# Patient Record
Sex: FEMALE | Race: OTHER | HISPANIC OR LATINO | URBAN - METROPOLITAN AREA
[De-identification: names, ages, dates, MRNs, and addresses within clinical notes are randomized per-mention and may not be internally consistent; named-entity substitution may affect disease eponyms.]

---

## 2020-01-31 ENCOUNTER — EMERGENCY (EMERGENCY)
Facility: HOSPITAL | Age: 80
LOS: 1 days | Discharge: HOME | End: 2020-01-31
Attending: EMERGENCY MEDICINE
Payer: COMMERCIAL

## 2020-01-31 VITALS
HEIGHT: 62 IN | WEIGHT: 160.06 LBS | DIASTOLIC BLOOD PRESSURE: 76 MMHG | SYSTOLIC BLOOD PRESSURE: 130 MMHG | HEART RATE: 94 BPM | TEMPERATURE: 99 F | OXYGEN SATURATION: 97 % | RESPIRATION RATE: 20 BRPM

## 2020-01-31 LAB
ALBUMIN SERPL ELPH-MCNC: 3.6 G/DL — SIGNIFICANT CHANGE UP (ref 3.5–5)
ALP SERPL-CCNC: 50 U/L — SIGNIFICANT CHANGE UP (ref 40–120)
ALT FLD-CCNC: 25 U/L DA — SIGNIFICANT CHANGE UP (ref 10–60)
ANION GAP SERPL CALC-SCNC: 7 MMOL/L — SIGNIFICANT CHANGE UP (ref 5–17)
AST SERPL-CCNC: 20 U/L — SIGNIFICANT CHANGE UP (ref 10–40)
BILIRUB SERPL-MCNC: 0.2 MG/DL — SIGNIFICANT CHANGE UP (ref 0.2–1.2)
BUN SERPL-MCNC: 13 MG/DL — SIGNIFICANT CHANGE UP (ref 7–18)
CALCIUM SERPL-MCNC: 8.5 MG/DL — SIGNIFICANT CHANGE UP (ref 8.4–10.5)
CHLORIDE SERPL-SCNC: 106 MMOL/L — SIGNIFICANT CHANGE UP (ref 96–108)
CO2 SERPL-SCNC: 26 MMOL/L — SIGNIFICANT CHANGE UP (ref 22–31)
CREAT SERPL-MCNC: 0.74 MG/DL — SIGNIFICANT CHANGE UP (ref 0.5–1.3)
GLUCOSE SERPL-MCNC: 126 MG/DL — HIGH (ref 70–99)
HCT VFR BLD CALC: 40.3 % — SIGNIFICANT CHANGE UP (ref 34.5–45)
HGB BLD-MCNC: 12.9 G/DL — SIGNIFICANT CHANGE UP (ref 11.5–15.5)
MCHC RBC-ENTMCNC: 29.6 PG — SIGNIFICANT CHANGE UP (ref 27–34)
MCHC RBC-ENTMCNC: 32 GM/DL — SIGNIFICANT CHANGE UP (ref 32–36)
MCV RBC AUTO: 92.4 FL — SIGNIFICANT CHANGE UP (ref 80–100)
NRBC # BLD: 0 /100 WBCS — SIGNIFICANT CHANGE UP (ref 0–0)
PLATELET # BLD AUTO: 213 K/UL — SIGNIFICANT CHANGE UP (ref 150–400)
POTASSIUM SERPL-MCNC: 3.8 MMOL/L — SIGNIFICANT CHANGE UP (ref 3.5–5.3)
POTASSIUM SERPL-SCNC: 3.8 MMOL/L — SIGNIFICANT CHANGE UP (ref 3.5–5.3)
PROT SERPL-MCNC: 7.9 G/DL — SIGNIFICANT CHANGE UP (ref 6–8.3)
RBC # BLD: 4.36 M/UL — SIGNIFICANT CHANGE UP (ref 3.8–5.2)
RBC # FLD: 13.7 % — SIGNIFICANT CHANGE UP (ref 10.3–14.5)
SODIUM SERPL-SCNC: 139 MMOL/L — SIGNIFICANT CHANGE UP (ref 135–145)
TROPONIN I SERPL-MCNC: <0.015 NG/ML — SIGNIFICANT CHANGE UP (ref 0–0.04)
WBC # BLD: 6.39 K/UL — SIGNIFICANT CHANGE UP (ref 3.8–10.5)
WBC # FLD AUTO: 6.39 K/UL — SIGNIFICANT CHANGE UP (ref 3.8–10.5)

## 2020-01-31 PROCEDURE — 99284 EMERGENCY DEPT VISIT MOD MDM: CPT | Mod: 25

## 2020-01-31 PROCEDURE — 99285 EMERGENCY DEPT VISIT HI MDM: CPT

## 2020-01-31 PROCEDURE — 80053 COMPREHEN METABOLIC PANEL: CPT

## 2020-01-31 PROCEDURE — 36415 COLL VENOUS BLD VENIPUNCTURE: CPT

## 2020-01-31 PROCEDURE — 93005 ELECTROCARDIOGRAM TRACING: CPT

## 2020-01-31 PROCEDURE — 94640 AIRWAY INHALATION TREATMENT: CPT

## 2020-01-31 PROCEDURE — 84484 ASSAY OF TROPONIN QUANT: CPT

## 2020-01-31 PROCEDURE — 85027 COMPLETE CBC AUTOMATED: CPT

## 2020-01-31 PROCEDURE — 71046 X-RAY EXAM CHEST 2 VIEWS: CPT | Mod: 26

## 2020-01-31 PROCEDURE — 71046 X-RAY EXAM CHEST 2 VIEWS: CPT

## 2020-01-31 RX ORDER — DEXAMETHASONE 0.5 MG/5ML
8 ELIXIR ORAL ONCE
Refills: 0 | Status: DISCONTINUED | OUTPATIENT
Start: 2020-01-31 | End: 2020-01-31

## 2020-01-31 RX ORDER — IPRATROPIUM/ALBUTEROL SULFATE 18-103MCG
3 AEROSOL WITH ADAPTER (GRAM) INHALATION ONCE
Refills: 0 | Status: COMPLETED | OUTPATIENT
Start: 2020-01-31 | End: 2020-01-31

## 2020-01-31 RX ADMIN — Medication 3 MILLILITER(S): at 06:31

## 2020-01-31 RX ADMIN — Medication 50 MILLIGRAM(S): at 05:31

## 2020-01-31 RX ADMIN — Medication 100 MILLIGRAM(S): at 05:31

## 2020-01-31 NOTE — ED PROVIDER NOTE - NS ED ROS FT
CONSTITUTIONAL: +fever, no chills   EYES: no visual changes, no eye pain   ENMT: no nasal congestion, no throat pain  CARDIOVASCULAR: no chest pain, no edema, no palpitations   RESPIRATORY: no shortness of breath, +cough   GASTROINTESTINAL: no abdominal pain, no nausea, no vomiting, no diarrhea, no constipation   GENITOURINARY: no dysuria, no frequency  MUSCULOSKELETAL: no joint pains, no myalgias, no back pain   SKIN: no rashes  NEUROLOGICAL: no weakness, no headache, no dizziness, no slurred speech, no syncope   PSYCHIATRIC: no known mental health illness   HEME/LYMPH: no lymphadenopathy      All other ROS negative except as per HPI

## 2020-01-31 NOTE — ED ADULT TRIAGE NOTE - CHIEF COMPLAINT QUOTE
10-Dec-2018 Pt compliant of having a cold and chest hurts when she cough that started on Wednesday. 10-Dec-2018 12:00

## 2020-01-31 NOTE — ED PROVIDER NOTE - PHYSICAL EXAMINATION
GENERAL: well appearing, no acute distress   HEAD: atraumatic   EYES: EOMI, pink conjunctiva   ENT: moist oral mucosa   CARDIAC: RRR, no edema, distal pulses present   RESPIRATORY: scant wheezing, no increased work of breathing   GASTROINTESTINAL: no abdominal tenderness, no rebound or guarding, bowel sounds presents  GENITOURINARY: no CVA tenderness   MUSCULOSKELETAL: no deformity   NEUROLOGICAL: AAOx3, spontaneous movement of extremities   SKIN: intact   PSYCHIATRIC: cooperative  HEME LYMPH: no lymphadenopathy

## 2020-01-31 NOTE — ED PROVIDER NOTE - PATIENT PORTAL LINK FT
You can access the FollowMyHealth Patient Portal offered by [facility] by registering at the following website: [email or web address]. By joining [employer]’s FollowMyHealth portal, you will also be able to view your health information using other applications (apps) compatible with our system.

## 2020-01-31 NOTE — ED PROVIDER NOTE - OBJECTIVE STATEMENT
80 yo F pmh of HTN, thyroid disease, asthma, and hx of MI (had cath but no stents placed) presents with fever to 100.4 and non productive cough x 2 days. Went to outside ED yesterday but wait was too long so pt left. Denies CP, SOB, wheezing, HA, dizziness, syncope, other acute complaints. Declined , opted for family to translate at bedside.

## 2020-01-31 NOTE — ED PROVIDER NOTE - PROGRESS NOTE DETAILS
CXR - similar to previous with R sided pleural scarring CXR - similar to previous with R lower adhesions  labs - trop negative, no leukocytosis   EKG - nsr, rate 85, no ischemic changes  Feeling better. Will dc with continue symptomatic support and PCP fu. Discussed indications for patient return to ED. Patient understood.

## 2020-01-31 NOTE — ED PROVIDER NOTE - NSFOLLOWUPINSTRUCTIONS_ED_ALL_ED_FT
Upper Respiratory Infection, Adult  An upper respiratory infection (URI) affects the nose, throat, and upper air passages. URIs are caused by germs (viruses). The most common type of URI is often called "the common cold."  Medicines cannot cure URIs, but you can do things at home to relieve your symptoms. URIs usually get better within 7–10 days.  Follow these instructions at home:  Activity     Rest as needed.If you have a fever, stay home from work or school until your fever is gone, or until your doctor says you may return to work or school.  You should stay home until you cannot spread the infection anymore (you are not contagious).Your doctor may have you wear a face mask so you have less risk of spreading the infection.Relieving symptoms     Gargle with a salt-water mixture 3–4 times a day or as needed. To make a salt-water mixture, completely dissolve ½–1 tsp of salt in 1 cup of warm water.Use a cool-mist humidifier to add moisture to the air. This can help you breathe more easily.Eating and drinking        Drink enough fluid to keep your pee (urine) pale yellow.Eat soups and other clear broths.General instructions        Take over-the-counter and prescription medicines only as told by your doctor. These include cold medicines, fever reducers, and cough suppressants.Do not use any products that contain nicotine or tobacco. These include cigarettes and e-cigarettes. If you need help quitting, ask your doctor.Avoid being where people are smoking (avoid secondhand smoke).Make sure you get regular shots and get the flu shot every year.Keep all follow-up visits as told by your doctor. This is important.How to avoid spreading infection to others        Wash your hands often with soap and water. If you do not have soap and water, use hand .Avoid touching your mouth, face, eyes, or nose.Cough or sneeze into a tissue or your sleeve or elbow. Do not cough or sneeze into your hand or into the air.Contact a doctor if:  You are getting worse, not better.You have any of these:  A fever.Chills.Brown or red mucus in your nose.Yellow or brown fluid (discharge)coming from your nose.Pain in your face, especially when you bend forward.Swollen neck glands.Pain with swallowing.White areas in the back of your throat.Get help right away if:  You have shortness of breath that gets worse.You have very bad or constant:  Headache.Ear pain.Pain in your forehead, behind your eyes, and over your cheekbones (sinus pain).Chest pain.You have long-lasting (chronic) lung disease along with any of these:  Wheezing.Long-lasting cough.Coughing up blood.A change in your usual mucus.You have a stiff neck.You have changes in your:  Vision.Hearing.Thinking.Mood.Summary  An upper respiratory infection (URI) is caused by a germ called a virus. The most common type of URI is often called "the common cold."URIs usually get better within 7–10 days.Take over-the-counter and prescription medicines only as told by your doctor.This information is not intended to replace advice given to you by your health care provider. Make sure you discuss any questions you have with your health care provider.    Document Released: 06/05/2009 Document Revised: 08/10/2018 Document Reviewed: 08/10/2018  [employer] Patient Education © 2019 [employer]

## 2020-06-25 ENCOUNTER — EMERGENCY (EMERGENCY)
Facility: HOSPITAL | Age: 80
LOS: 1 days | Discharge: HOME | End: 2020-06-25
Attending: EMERGENCY MEDICINE
Payer: COMMERCIAL

## 2020-06-25 VITALS
DIASTOLIC BLOOD PRESSURE: 79 MMHG | HEART RATE: 84 BPM | TEMPERATURE: 98 F | SYSTOLIC BLOOD PRESSURE: 148 MMHG | RESPIRATION RATE: 18 BRPM | OXYGEN SATURATION: 98 %

## 2020-06-25 VITALS — HEIGHT: 62 IN | WEIGHT: 160.06 LBS

## 2020-06-25 LAB
ALBUMIN SERPL ELPH-MCNC: 3.8 G/DL — SIGNIFICANT CHANGE UP (ref 3.5–5)
ALP SERPL-CCNC: 54 U/L — SIGNIFICANT CHANGE UP (ref 40–120)
ALT FLD-CCNC: 26 U/L DA — SIGNIFICANT CHANGE UP (ref 10–60)
ANION GAP SERPL CALC-SCNC: 8 MMOL/L — SIGNIFICANT CHANGE UP (ref 5–17)
AST SERPL-CCNC: 20 U/L — SIGNIFICANT CHANGE UP (ref 10–40)
BASOPHILS # BLD AUTO: 0.05 K/UL — SIGNIFICANT CHANGE UP (ref 0–0.2)
BASOPHILS NFR BLD AUTO: 0.7 % — SIGNIFICANT CHANGE UP (ref 0–2)
BILIRUB SERPL-MCNC: 0.4 MG/DL — SIGNIFICANT CHANGE UP (ref 0.2–1.2)
BUN SERPL-MCNC: 11 MG/DL — SIGNIFICANT CHANGE UP (ref 7–18)
CALCIUM SERPL-MCNC: 9.2 MG/DL — SIGNIFICANT CHANGE UP (ref 8.4–10.5)
CHLORIDE SERPL-SCNC: 104 MMOL/L — SIGNIFICANT CHANGE UP (ref 96–108)
CO2 SERPL-SCNC: 29 MMOL/L — SIGNIFICANT CHANGE UP (ref 22–31)
CREAT SERPL-MCNC: 0.66 MG/DL — SIGNIFICANT CHANGE UP (ref 0.5–1.3)
EOSINOPHIL # BLD AUTO: 0.1 K/UL — SIGNIFICANT CHANGE UP (ref 0–0.5)
EOSINOPHIL NFR BLD AUTO: 1.5 % — SIGNIFICANT CHANGE UP (ref 0–6)
GLUCOSE SERPL-MCNC: 107 MG/DL — HIGH (ref 70–99)
HCT VFR BLD CALC: 40.9 % — SIGNIFICANT CHANGE UP (ref 34.5–45)
HGB BLD-MCNC: 13.3 G/DL — SIGNIFICANT CHANGE UP (ref 11.5–15.5)
IMM GRANULOCYTES NFR BLD AUTO: 0.1 % — SIGNIFICANT CHANGE UP (ref 0–1.5)
LYMPHOCYTES # BLD AUTO: 2.5 K/UL — SIGNIFICANT CHANGE UP (ref 1–3.3)
LYMPHOCYTES # BLD AUTO: 36.7 % — SIGNIFICANT CHANGE UP (ref 13–44)
MCHC RBC-ENTMCNC: 30 PG — SIGNIFICANT CHANGE UP (ref 27–34)
MCHC RBC-ENTMCNC: 32.5 GM/DL — SIGNIFICANT CHANGE UP (ref 32–36)
MCV RBC AUTO: 92.3 FL — SIGNIFICANT CHANGE UP (ref 80–100)
MONOCYTES # BLD AUTO: 0.51 K/UL — SIGNIFICANT CHANGE UP (ref 0–0.9)
MONOCYTES NFR BLD AUTO: 7.5 % — SIGNIFICANT CHANGE UP (ref 2–14)
NEUTROPHILS # BLD AUTO: 3.65 K/UL — SIGNIFICANT CHANGE UP (ref 1.8–7.4)
NEUTROPHILS NFR BLD AUTO: 53.5 % — SIGNIFICANT CHANGE UP (ref 43–77)
NRBC # BLD: 0 /100 WBCS — SIGNIFICANT CHANGE UP (ref 0–0)
PLATELET # BLD AUTO: 241 K/UL — SIGNIFICANT CHANGE UP (ref 150–400)
POTASSIUM SERPL-MCNC: 4.2 MMOL/L — SIGNIFICANT CHANGE UP (ref 3.5–5.3)
POTASSIUM SERPL-SCNC: 4.2 MMOL/L — SIGNIFICANT CHANGE UP (ref 3.5–5.3)
PROT SERPL-MCNC: 8 G/DL — SIGNIFICANT CHANGE UP (ref 6–8.3)
RBC # BLD: 4.43 M/UL — SIGNIFICANT CHANGE UP (ref 3.8–5.2)
RBC # FLD: 12.6 % — SIGNIFICANT CHANGE UP (ref 10.3–14.5)
SODIUM SERPL-SCNC: 141 MMOL/L — SIGNIFICANT CHANGE UP (ref 135–145)
TROPONIN I SERPL-MCNC: <0.015 NG/ML — SIGNIFICANT CHANGE UP (ref 0–0.04)
WBC # BLD: 6.82 K/UL — SIGNIFICANT CHANGE UP (ref 3.8–10.5)
WBC # FLD AUTO: 6.82 K/UL — SIGNIFICANT CHANGE UP (ref 3.8–10.5)

## 2020-06-25 PROCEDURE — 93005 ELECTROCARDIOGRAM TRACING: CPT

## 2020-06-25 PROCEDURE — 99285 EMERGENCY DEPT VISIT HI MDM: CPT | Mod: 25

## 2020-06-25 PROCEDURE — 99284 EMERGENCY DEPT VISIT MOD MDM: CPT | Mod: 25

## 2020-06-25 PROCEDURE — 84484 ASSAY OF TROPONIN QUANT: CPT

## 2020-06-25 PROCEDURE — 36415 COLL VENOUS BLD VENIPUNCTURE: CPT

## 2020-06-25 PROCEDURE — 70450 CT HEAD/BRAIN W/O DYE: CPT

## 2020-06-25 PROCEDURE — 85027 COMPLETE CBC AUTOMATED: CPT

## 2020-06-25 PROCEDURE — 93010 ELECTROCARDIOGRAM REPORT: CPT

## 2020-06-25 PROCEDURE — 70450 CT HEAD/BRAIN W/O DYE: CPT | Mod: 26

## 2020-06-25 PROCEDURE — 80053 COMPREHEN METABOLIC PANEL: CPT

## 2020-06-25 NOTE — ED PROVIDER NOTE - MDM ORDERS SUBMITTED SELECTION
[General Appearance - Well Developed] : well developed [General Appearance - Alert] : alert [General Appearance - In No Acute Distress] : in no acute distress [Sclera] : the sclera and conjunctiva were normal [Hearing Threshold Finger Rub Not [place]] : hearing was normal [Examination Of The Oral Cavity] : the lips and gums were normal [Normal Oral Cavity] : oral cavity was normal [Heart Rate And Rhythm] : heart rate and rhythm were normal [Heart Sounds] : normal S1 and S2 [Normal] : no JVD, no calf tenderness [Symmetric] : breasts are symmetric EKG/Imaging Studies/Labs [Breast Abnormal Lactation (Galactorrhea)] : no nipple discharge [No UE Edema] : there is no upper extremity edema [Abdomen Soft] : soft [Bowel Sounds] : normal bowel sounds [Cervical Lymph Nodes Enlarged Anterior Bilaterally] : anterior cervical [Cervical Lymph Nodes Enlarged Posterior Bilaterally] : posterior cervical [Supraclavicular Lymph Nodes Enlarged Bilaterally] : supraclavicular [Axillary Lymph Nodes Enlarged Bilaterally] : axillary [Motor Tone] : muscle strength and tone were normal [Oriented To Time, Place, And Person] : oriented to person, place, and time [Abdomen Tenderness] : non-tender [] : no hepato-splenomegaly [No Spine Tenderness] : no tenderness to palpation of the vertebral spine [No Focal Deficits] : no focal deficits [Breast Palpation Mass] : no palpable masses [de-identified] : left breast upper outer quadrant horizontal scar and axillary incisions healed

## 2020-06-25 NOTE — ED PROVIDER NOTE - CROS ED CONS ALL NEG
Spoke with patient instructed per. Dr. Robin that he needs to see G.I. department for positive Cologuard.Patient is living in [facility] and will be in town December 11 for eye appointment would like G.I. appointment scheduled that week. Scheduled patient 12-12-19 at 1 pm with Helene KELLEY in [place] due to Angelique Minor being at a conference that week. Patient verbalized understanding of instructions.     negative...

## 2020-06-25 NOTE — ED PROVIDER NOTE - PATIENT PORTAL LINK FT
You can access the FollowMyHealth Patient Portal offered by [facility] by registering at the following website: [email or web address]. By joining [employer]’s FollowMyHealth portal, you will also be able to view your health information using other applications (apps) compatible with our system.

## 2020-06-25 NOTE — ED PROVIDER NOTE - NSFOLLOWUPCLINICS_GEN_ALL_ED_FT
[facility]  Neurology  [address] [place] [address]  Phone: [phone]  Fax: [phone]  Follow Up Time:

## 2020-06-25 NOTE — ED PROVIDER NOTE - CLINICAL SUMMARY MEDICAL DECISION MAKING FREE TEXT BOX
pt with episodes of dizziness today after getting up from bed.  hx to htn and was concerned that bp was high   will check labs, ekg, ct head and if normal will give meclizine and reassess if feeling better will send rx and pt can follow up with pmd this week.  talked to daughter who will drive to the ED to pick pt up to drive home  all test results reviewed with daughter (with pt's permission)

## 2020-06-25 NOTE — ED ADULT TRIAGE NOTE - CHIEF COMPLAINT QUOTE
dizziness started this morning after she woke up , sent from [facility] for blood work

## 2020-06-25 NOTE — ED PROVIDER NOTE - CHPI ED SYMPTOMS NEG
no confusion/no numbness/no fever/no blurred vision/no change in level of consciousness/no loss of consciousness/no weakness/no vomiting

## 2020-06-25 NOTE — ED ADULT NURSE NOTE - CHIEF COMPLAINT QUOTE
dizziness started this morning after she woke up , sent from [facility] for blood work

## 2020-06-25 NOTE — ED PROVIDER NOTE - OBJECTIVE STATEMENT
used  and spoke to pt's daughter as well  pt with hx of htn  now with dizziness since this morning and last night when getting up from bed   no ha no cp no sob no visual changes no orthopnea

## 2020-06-25 NOTE — ED PROVIDER NOTE - PROGRESS NOTE DETAILS
Dmitri: patient signed out by Dr. Moreland. patient feeling improved. neurologically intact without focal deficits. CT and labs unremarkable. discussed results via  ([id number]). patient daughter, Kerwin also informed of results. To follow up with PMD.

## 2020-06-25 NOTE — ED PROVIDER NOTE - CARE PROVIDER_API CALL
[facility]  Internal Medicine  [address] [place] [address]  Phone: [phone]  Fax: [phone]  Follow Up Time: Routine

## 2020-06-25 NOTE — ED PROVIDER NOTE - NSFOLLOWUPINSTRUCTIONS_ED_ALL_ED_FT
You were seen today for your dizziness. Your labs and your CAT scan did not show acute abnormalities. Please follow up with your primary care doctor. Please return to the Emergency Department for worsening signs or symptoms.    Te vieron hoy por tu mareo. Lynda laboratorios y mcduffie TAC no mostraron anormalidades agudas. Jeffy un seguimiento con mcduffie médico de atención primaria. Regrese al Departamento de emergencias para empeorar los signos o síntomas.

## 2020-06-25 NOTE — ED ADULT NURSE NOTE - OBJECTIVE STATEMENT
[id number]  Dizziness with nausea this morning went to MD office EKG done was referred to hospital for further workup

## 2020-10-27 ENCOUNTER — EMERGENCY (EMERGENCY)
Facility: HOSPITAL | Age: 80
LOS: 1 days | Discharge: HOME | End: 2020-10-27
Attending: STUDENT IN AN ORGANIZED HEALTH CARE EDUCATION/TRAINING PROGRAM
Payer: COMMERCIAL

## 2020-10-27 VITALS
RESPIRATION RATE: 18 BRPM | TEMPERATURE: 97 F | SYSTOLIC BLOOD PRESSURE: 139 MMHG | HEART RATE: 72 BPM | OXYGEN SATURATION: 97 % | DIASTOLIC BLOOD PRESSURE: 60 MMHG

## 2020-10-27 VITALS
OXYGEN SATURATION: 97 % | WEIGHT: 169.76 LBS | SYSTOLIC BLOOD PRESSURE: 145 MMHG | HEIGHT: 62 IN | DIASTOLIC BLOOD PRESSURE: 83 MMHG | RESPIRATION RATE: 20 BRPM | TEMPERATURE: 98 F | HEART RATE: 73 BPM

## 2020-10-27 PROBLEM — I10 ESSENTIAL (PRIMARY) HYPERTENSION: Chronic | Status: ACTIVE | Noted: 2020-07-07

## 2020-10-27 PROCEDURE — 93971 EXTREMITY STUDY: CPT | Mod: 26,LT

## 2020-10-27 PROCEDURE — 99284 EMERGENCY DEPT VISIT MOD MDM: CPT

## 2020-10-27 PROCEDURE — 99284 EMERGENCY DEPT VISIT MOD MDM: CPT | Mod: 25

## 2020-10-27 PROCEDURE — 93971 EXTREMITY STUDY: CPT

## 2020-10-27 RX ORDER — ACETAMINOPHEN 500 MG
650 TABLET ORAL ONCE
Refills: 0 | Status: COMPLETED | OUTPATIENT
Start: 2020-10-27 | End: 2020-10-27

## 2020-10-27 RX ADMIN — Medication 650 MILLIGRAM(S): at 11:24

## 2020-10-27 RX ADMIN — Medication 650 MILLIGRAM(S): at 12:06

## 2020-10-27 NOTE — ED PROVIDER NOTE - NSFOLLOWUPINSTRUCTIONS_ED_ALL_ED_FT
Follow up with the vascular surgeon within 1 week.  If you experience any new or worsening symptoms or if you are concerned you can always come back to the emergency for a re-evaluation.  Tylenol for pain as needed.

## 2020-10-27 NOTE — ED PROVIDER NOTE - PHYSICAL EXAMINATION
Bilateral lower extremities equally warm without discoloration. No edema. Bilateral thigh and calf compartments soft. Popliteal, DP and PT pulses intact 2+ bilaterally. Cap. refill < 2 sec. Bialteral varicose veins from inner thigh to feet. L thigh tenderness. No sensory deficits.

## 2020-10-27 NOTE — ED PROVIDER NOTE - CARE PROVIDER_API CALL
Sheila Abbasi  SURGERY  [address], [address]  [place] [address]  Phone: [phone]  Fax: [phone]  Follow Up Time:

## 2020-10-27 NOTE — ED PROVIDER NOTE - PATIENT PORTAL LINK FT
You can access the FollowMyHealth Patient Portal offered by [facility] by registering at the following website: [email or web address]. By joining [employer]’s FollowMyHealth portal, you will also be able to view your health information using other applications (apps) compatible with our system.

## 2020-10-27 NOTE — ED PROVIDER NOTE - CLINICAL SUMMARY MEDICAL DECISION MAKING FREE TEXT BOX
Neurovascularly intact. No signs of acute arterial occulsion or cellulitis. Low suspicion of nec fasc or DVT. Will do Doppler r/o DVT, tylenol and re-assess. History and findings consistent with most likely chronic venous insufficiency due to varicose veins.

## 2020-10-27 NOTE — ED PROVIDER NOTE - ATTENDING CONTRIBUTION TO CARE
I performed the initial face to face bedside interview with this patient regarding history of present illness, review of symptoms and past medical, social and family history.  I completed an independent physical examination.  I was the initial provider who evaluated this patient.  The history, review of symptoms and examination was documented by the scribe in my presence and I attest to the accuracy of the documentation.  I have signed out the follow up of any pending tests (i.e. labs, radiological studies) to the PA/NP.  I have discussed the patient’s plan of care and disposition with the PA/NP.   well appearing female, no acute distress, ambulating without assistance. duplex.

## 2020-10-27 NOTE — ED PROVIDER NOTE - PROGRESS NOTE DETAILS
Doppler negative. Will dc with vasc surgery follow up within 1 week and elevation of legs. Pt is well appearing walking with steady gait, stable for discharge and follow up without fail with medical doctor. I had a detailed discussion with the patient and/or guardian regarding the historical points, exam findings, and any diagnostic results supporting the discharge diagnosis. Pt educated on care and need for follow up. Strict return instructions and red flag signs and symptoms discussed with patient. Questions answered. Pt shows understanding of discharge information and agrees to follow.

## 2020-10-27 NOTE — ED ADULT TRIAGE NOTE - INTERNATIONAL TRAVEL
Pt went to her other doctor in [place] about her sinus infection and the doctor prescribed a certain medicine for it. Patient says the medicine has not worked at all and she feels worse. Patient wants to know if [facility] can fill the amoxicillin (500mg) because she started a new job and can't leave work early to be seen by [employer]. Pt also mention that she found three left over amoxicillin pills from the last time they were prescribed and she took one this morning and one this evening. She wants to know if that is okay and won\"t make her symptoms worse. No

## 2020-10-27 NOTE — ED PROVIDER NOTE - OBJECTIVE STATEMENT
Prefers for daughter to translate: 79 year-old female, history of HTN, hypothyroidism, varicose veins, presents with cc LLE pain since last night. Reports that has had on/off pain to both LE for years. Pain in the LLE is described as aching, moderate, aggravated when standing for long time and resolved completely when elevating legs or laying down. Pain got worse last night. Took Tylenol with some relief. Denies any recent injury, swelling, redness, streaking, numbness, tingling, focal weakness, or any other complaints. No history of smoking or DM.

## 2020-10-27 NOTE — ED ADULT NURSE REASSESSMENT NOTE - NS ED NURSE REASSESS COMMENT FT1
Pt received A&Ox3, MAEx4, no signs of distress, swelling, no redness, skin integrity maintained, safety maintained, v/s WDL, pt needs addressed, will continue to care for the pt.

## 2020-12-07 PROBLEM — Z00.00 ENCOUNTER FOR PREVENTIVE HEALTH EXAMINATION: Status: ACTIVE | Noted: 2020-12-07

## 2021-03-28 ENCOUNTER — EMERGENCY (EMERGENCY)
Facility: HOSPITAL | Age: 81
LOS: 1 days | Discharge: HOME | End: 2021-03-28
Attending: EMERGENCY MEDICINE
Payer: COMMERCIAL

## 2021-03-28 VITALS
RESPIRATION RATE: 18 BRPM | HEART RATE: 83 BPM | OXYGEN SATURATION: 98 % | SYSTOLIC BLOOD PRESSURE: 157 MMHG | DIASTOLIC BLOOD PRESSURE: 60 MMHG

## 2021-03-28 VITALS
SYSTOLIC BLOOD PRESSURE: 176 MMHG | DIASTOLIC BLOOD PRESSURE: 91 MMHG | HEIGHT: 62 IN | OXYGEN SATURATION: 98 % | RESPIRATION RATE: 16 BRPM | WEIGHT: 175.05 LBS | HEART RATE: 94 BPM | TEMPERATURE: 99 F

## 2021-03-28 PROBLEM — E03.9 HYPOTHYROIDISM, UNSPECIFIED: Chronic | Status: ACTIVE | Noted: 2020-10-27

## 2021-03-28 PROCEDURE — G1004: CPT

## 2021-03-28 PROCEDURE — 70450 CT HEAD/BRAIN W/O DYE: CPT | Mod: 26,MF

## 2021-03-28 PROCEDURE — 96374 THER/PROPH/DIAG INJ IV PUSH: CPT

## 2021-03-28 PROCEDURE — 99284 EMERGENCY DEPT VISIT MOD MDM: CPT

## 2021-03-28 PROCEDURE — 70450 CT HEAD/BRAIN W/O DYE: CPT

## 2021-03-28 PROCEDURE — 99284 EMERGENCY DEPT VISIT MOD MDM: CPT | Mod: 25

## 2021-03-28 RX ORDER — METOCLOPRAMIDE HCL 10 MG
10 TABLET ORAL ONCE
Refills: 0 | Status: COMPLETED | OUTPATIENT
Start: 2021-03-28 | End: 2021-03-28

## 2021-03-28 RX ORDER — KETOROLAC TROMETHAMINE 30 MG/ML
15 SYRINGE (ML) INJECTION ONCE
Refills: 0 | Status: DISCONTINUED | OUTPATIENT
Start: 2021-03-28 | End: 2021-03-28

## 2021-03-28 RX ADMIN — Medication 10 MILLIGRAM(S): at 11:38

## 2021-03-28 NOTE — ED PROVIDER NOTE - CHPI ED SYMPTOMS NEG
no neurological deficits, difficulty with speech or understanding, paresthesias/no numbness/no weakness

## 2021-03-28 NOTE — ED PROVIDER NOTE - PATIENT PORTAL LINK FT
You can access the FollowMyHealth Patient Portal offered by [facility] by registering at the following website: [email or web address]. By joining [employer]’s FollowMyHealth portal, you will also be able to view your health information using other applications (apps) compatible with our system.

## 2021-03-28 NOTE — ED PROVIDER NOTE - OBJECTIVE STATEMENT
80 year old female with PMHx of hypertension and hypothyroidism and no significant PSHx presents to the ED with complaints of a headache for three days. Patient states that she has been taking Tylenol and Motrin at home with no relief. Patient reports that she has had headaches before, however notes that typically they are relieved with taking pain medications at home. Patient otherwise denies any difficulty with speech or cognition, weakness, numbness, paresthesias, neurological deficits, and all other acute complaints. NKDA.

## 2021-07-18 ENCOUNTER — EMERGENCY (EMERGENCY)
Facility: HOSPITAL | Age: 81
LOS: 1 days | Discharge: HOME | End: 2021-07-18
Attending: EMERGENCY MEDICINE
Payer: COMMERCIAL

## 2021-07-18 VITALS
SYSTOLIC BLOOD PRESSURE: 175 MMHG | WEIGHT: 164.02 LBS | TEMPERATURE: 98 F | RESPIRATION RATE: 17 BRPM | DIASTOLIC BLOOD PRESSURE: 83 MMHG | OXYGEN SATURATION: 98 % | HEART RATE: 82 BPM | HEIGHT: 62 IN

## 2021-07-18 PROCEDURE — 73502 X-RAY EXAM HIP UNI 2-3 VIEWS: CPT

## 2021-07-18 PROCEDURE — 73562 X-RAY EXAM OF KNEE 3: CPT

## 2021-07-18 PROCEDURE — 99284 EMERGENCY DEPT VISIT MOD MDM: CPT

## 2021-07-18 PROCEDURE — 99284 EMERGENCY DEPT VISIT MOD MDM: CPT | Mod: 25

## 2021-07-18 PROCEDURE — 93971 EXTREMITY STUDY: CPT | Mod: 26,LT

## 2021-07-18 PROCEDURE — 93971 EXTREMITY STUDY: CPT

## 2021-07-18 PROCEDURE — 73562 X-RAY EXAM OF KNEE 3: CPT | Mod: 26,LT

## 2021-07-18 PROCEDURE — 73502 X-RAY EXAM HIP UNI 2-3 VIEWS: CPT | Mod: 26,LT

## 2021-07-18 RX ORDER — IBUPROFEN 200 MG
1 TABLET ORAL
Qty: 30 | Refills: 0
Start: 2021-07-18

## 2021-07-18 RX ORDER — ACETAMINOPHEN WITH CODEINE 300MG-30MG
1 TABLET ORAL ONCE
Refills: 0 | Status: DISCONTINUED | OUTPATIENT
Start: 2021-07-18 | End: 2021-07-18

## 2021-07-18 RX ORDER — ACETAMINOPHEN WITH CODEINE 300MG-30MG
1 TABLET ORAL
Qty: 10 | Refills: 0
Start: 2021-07-18

## 2021-07-18 RX ADMIN — Medication 1 TABLET(S): at 16:06

## 2021-07-18 NOTE — ED ADULT NURSE NOTE - OBJECTIVE STATEMENT
pt is an 79 y/o  left knee pain  started  today pt is an 81 y/o  left knee pain  started  today denies any injury

## 2021-07-18 NOTE — ED ADULT NURSE NOTE - NSIMPLEMENTINTERV_GEN_ALL_ED
Implemented All Universal Safety Interventions:  [place] to call system. Call bell, personal items and telephone within reach. Instruct patient to call for assistance. Room bathroom lighting operational. Non-slip footwear when patient is off stretcher. Physically safe environment: no spills, clutter or unnecessary equipment. Stretcher in lowest position, wheels locked, appropriate side rails in place.

## 2021-07-18 NOTE — ED PROVIDER NOTE - PHYSICAL EXAMINATION
no spinal tenderness to palpation. tenderness to palpation to left buttock, left calf and left thigh. mild left knee tenderness to palpation.

## 2021-07-18 NOTE — ED PROVIDER NOTE - OBJECTIVE STATEMENT
patient with pain radiating down left leg. worse when moving. Hx thrombophlebitis. feels leg is swollen. took 2 aleve with no relief.

## 2021-07-18 NOTE — ED PROVIDER NOTE - CLINICAL SUMMARY MEDICAL DECISION MAKING FREE TEXT BOX
Patient with left pain. will do xray duplex, pain control Patient with left pain. will do xray duplex, pain control.   xray reveals arthritis. home with primary care physician and rx for medication

## 2021-07-18 NOTE — ED PROVIDER NOTE - NSFOLLOWUPINSTRUCTIONS_ED_ALL_ED_FT
Ciática    LO QUE NECESITA SABER:    La ciática es mariana condición que causa dolor en el nervio ciático. El nervio ciático sale de la joshua dorsal y corre por ambos lados de los glúteos. Luego baja por la parte posterior del muslo, la parte inferior de la pierna y el pie. El nervio ciático puede estar comprimido, inflamado, irritado o estirado.    MIENTRAS USTED ESTÁ AQUÍ:    Consentimiento informadoes un documento legal que explica los exámenes, tratamientos, o procedimientos que usted podría necesitar. Un consentimiento informado significa que usted comprende que es lo que se va a realizar y que puede ahsan decisiones sobre lo que usted desee. Usted da mcduffie permiso al firmar el formulario de consentimiento. Puede designar a otra persona para que firme francisco javier formulario por usted si usted no puede hacerlo. Usted tiene el derecho de comprender mcduffie cuidado médico en términos o palabras que usted pueda entender. Antes de firmar el documento de consentimiento, entienda los riesgos y beneficios de lo que se le hará. Asegúrese que todas luana preguntas lino contestadas.    Actividad:Es posible que al principio deba guardar reposo en cama. No permanezca en la cama por más de 2 días. Mcduffie médico le indicará cuando se puede levantar de la cama. Llame a mcduffie médico antes de levantarse por primera vez. Conforme el dolor disminuya, es posible que debe empezar a moverse o caminar para recuperarse más rápidamente.    Medicamentos:  •Analgésicos:Es posible que le receten un medicamento para aliviar el dolor. No espere hasta que mcduffie dolor esté muy phuong para solicitar más medicamento.      •Relajantes muscularesayudan a reducir dolor y espasmos musculares.      •Medicamento esteroide epidural:Puede incluir tanto un anestésico (medicamento para adormecer) maddy un esteroide, que puede bajar la inflamación y calmar el dolor. Se administra en forma de inyección cerca de la joshua dorsal, en el área donde siente el dolor.      Exámenes:  •Radiografía:Son imágenes de los huesos y los tejidos de mcduffie espalda, caderas, muslos o piernas. Es posible que esta prueba muestre otros problemas, maddy fracturas (huesos quebrados).      •Tomografía computarizada:Francisco Javier examen también se conoce maddy escán TAC. Mariana máquina de geena x utiliza mariana computadora para ahsan imágenes de luana caderas, muslos y piernas. Puede que las imágenes muestren mcduffie nervio ciático, músculos y vasos sanguíneos. Es posible que le administren un medio de contraste antes de ahsan las imágenes para que los médicos las puedan nathaly con mayor claridad. Dígale al médico si usted alguna vez ha tenido mariana reacción alérgica al tinte de contraste.      •Imágenes por resonancia magnética (IRM):En francisco javier estudio se utilizan imanes potentes y mariana computadora para ahsan imágenes de las caderas, muslos y piernas. Puede que la IRM muestre si se ha dañado los nervios, músculos, huesos y vasos sanguíneos. Le podrían administrar un tinte para ayudar a que las imágenes se vean mejor. Dígale al médico si usted alguna vez ha tenido mariana reacción alérgica al tinte de contraste. No entre a la nga donde se realiza la resonancia magnética con algo de metal. El metal puede causar lesiones serias. Dígale al médico si usted tiene algo de metal dentro de mcduffie cuerpo o por encima.      •Mariana electromiografía (EMG)es un examen que mide la actividad eléctrica de luana músculos en descanso y en movimiento.      •Pruebas de conducción nerviosa:Estos estudios comprueban la manera en que los nervios superficiales y los músculos relacionados reaccionan a la estimulación. Se colocan electrodos con cables o agujas diminutas en ciertas áreas, maddy los glúteos y las piernas.      Tratamiento:  •Terapia de ultrasonido:Emplea ondas sonoras para aliviar el dolor. Es posible que se use además un medicamento tópico para contribuir a calmar el dolor y la inflamación.      •Fisioterapia:Un fisioterapeuta le puede enseñar ejercicios para ayudarle a mejorar el movimiento y la fuerza, y para disminuir el dolor. Un terapeuta ocupacional le enseña habilidades para ayudarlo con luana actividades diarias.      •Dispositivos de asistencia:Es posible que deba usar un soporte para la espalda, maddy mariana faja, abrazadera o corsé. Puede que necesite muletas, un bastón o un andador para disminuir la presión en los músculos de la parte inferior de la espalda y las piernas. Pregunte a mcduffie médico por más información sobre los dispositivos de asistencia y cómo se usan de forma correcta.      •Quimionucleólisis:Se administra mariana inyección en el disco afectado para ablandarlo o encogerlo.      •Cirugía:Es posible que se realice mariana cirugía para corregir problemas maddy un disco dañado o un tumor en la columna. Se puede realizar para disminuir la presión en el nervio ciático. Los médicos también pueden liberar los músculos que están presionando el nervio ciático.      RIESGOS:    Si no se administra correctamente, la inyección epidural de esteroide puede resultar en trastornos de dolor o parálisis. También puede causar dolor de shonna, dolor en la pierna y bloqueo del flujo sanguíneo a la médula magallon. Puede sangrar o contraer mariana infección maddy resultado de la cirugía. Si no recibe tratamiento, luana músculos y nervios se podrían dañar de forma permanente. Es posible que tenga menos fuerza. Es posible que no pueda  la pierna o controlar cuándo orina o evacua los intestinos.    ACUERDOS SOBRE MCDUFFIE CUIDADO:    Usted tiene el derecho de ayudar a planear mcduffie cuidado. Aprenda todo lo que pueda sobre mcduffie condición y maddy darle tratamiento. Discuta luana opciones de tratamiento con luana médicos para decidir el cuidado que usted desea recibir. Usted siempre tiene el derecho de rechazar el tratamiento.

## 2021-07-18 NOTE — ED PROVIDER NOTE - PATIENT PORTAL LINK FT
You can access the FollowMyHealth Patient Portal offered by [facility] by registering at the following website: [email or web address]. By joining [employer]’s FollowMyHealth portal, you will also be able to view your health information using other applications (apps) compatible with our system.

## 2023-01-01 ENCOUNTER — EMERGENCY (EMERGENCY)
Facility: HOSPITAL | Age: 83
LOS: 1 days | Discharge: HOME | End: 2023-01-01
Attending: EMERGENCY MEDICINE
Payer: COMMERCIAL

## 2023-01-01 VITALS
OXYGEN SATURATION: 97 % | SYSTOLIC BLOOD PRESSURE: 128 MMHG | HEART RATE: 87 BPM | RESPIRATION RATE: 18 BRPM | DIASTOLIC BLOOD PRESSURE: 76 MMHG | TEMPERATURE: 98 F

## 2023-01-01 VITALS
SYSTOLIC BLOOD PRESSURE: 144 MMHG | DIASTOLIC BLOOD PRESSURE: 74 MMHG | HEIGHT: 62 IN | TEMPERATURE: 99 F | WEIGHT: 160.06 LBS | RESPIRATION RATE: 18 BRPM | HEART RATE: 98 BPM | OXYGEN SATURATION: 96 %

## 2023-01-01 LAB
ALBUMIN SERPL ELPH-MCNC: 3 G/DL — LOW (ref 3.5–5)
ALP SERPL-CCNC: 45 U/L — SIGNIFICANT CHANGE UP (ref 40–120)
ALT FLD-CCNC: 17 U/L DA — SIGNIFICANT CHANGE UP (ref 10–60)
ANION GAP SERPL CALC-SCNC: 6 MMOL/L — SIGNIFICANT CHANGE UP (ref 5–17)
AST SERPL-CCNC: 12 U/L — SIGNIFICANT CHANGE UP (ref 10–40)
BASOPHILS # BLD AUTO: 0.04 K/UL — SIGNIFICANT CHANGE UP (ref 0–0.2)
BASOPHILS NFR BLD AUTO: 0.4 % — SIGNIFICANT CHANGE UP (ref 0–2)
BILIRUB SERPL-MCNC: 0.3 MG/DL — SIGNIFICANT CHANGE UP (ref 0.2–1.2)
BUN SERPL-MCNC: 13 MG/DL — SIGNIFICANT CHANGE UP (ref 7–18)
CALCIUM SERPL-MCNC: 9 MG/DL — SIGNIFICANT CHANGE UP (ref 8.4–10.5)
CHLORIDE SERPL-SCNC: 107 MMOL/L — SIGNIFICANT CHANGE UP (ref 96–108)
CO2 SERPL-SCNC: 29 MMOL/L — SIGNIFICANT CHANGE UP (ref 22–31)
CREAT SERPL-MCNC: 0.67 MG/DL — SIGNIFICANT CHANGE UP (ref 0.5–1.3)
EGFR: 87 ML/MIN/1.73M2 — SIGNIFICANT CHANGE UP
EOSINOPHIL # BLD AUTO: 0.14 K/UL — SIGNIFICANT CHANGE UP (ref 0–0.5)
EOSINOPHIL NFR BLD AUTO: 1.3 % — SIGNIFICANT CHANGE UP (ref 0–6)
GLUCOSE SERPL-MCNC: 143 MG/DL — HIGH (ref 70–99)
HCT VFR BLD CALC: 37.7 % — SIGNIFICANT CHANGE UP (ref 34.5–45)
HGB BLD-MCNC: 12 G/DL — SIGNIFICANT CHANGE UP (ref 11.5–15.5)
IMM GRANULOCYTES NFR BLD AUTO: 0.3 % — SIGNIFICANT CHANGE UP (ref 0–0.9)
LYMPHOCYTES # BLD AUTO: 2.21 K/UL — SIGNIFICANT CHANGE UP (ref 1–3.3)
LYMPHOCYTES # BLD AUTO: 20.5 % — SIGNIFICANT CHANGE UP (ref 13–44)
MCHC RBC-ENTMCNC: 29.6 PG — SIGNIFICANT CHANGE UP (ref 27–34)
MCHC RBC-ENTMCNC: 31.8 GM/DL — LOW (ref 32–36)
MCV RBC AUTO: 92.9 FL — SIGNIFICANT CHANGE UP (ref 80–100)
MONOCYTES # BLD AUTO: 1.06 K/UL — HIGH (ref 0–0.9)
MONOCYTES NFR BLD AUTO: 9.8 % — SIGNIFICANT CHANGE UP (ref 2–14)
NEUTROPHILS # BLD AUTO: 7.29 K/UL — SIGNIFICANT CHANGE UP (ref 1.8–7.4)
NEUTROPHILS NFR BLD AUTO: 67.7 % — SIGNIFICANT CHANGE UP (ref 43–77)
NRBC # BLD: 0 /100 WBCS — SIGNIFICANT CHANGE UP (ref 0–0)
PLATELET # BLD AUTO: 228 K/UL — SIGNIFICANT CHANGE UP (ref 150–400)
POTASSIUM SERPL-MCNC: 4 MMOL/L — SIGNIFICANT CHANGE UP (ref 3.5–5.3)
POTASSIUM SERPL-SCNC: 4 MMOL/L — SIGNIFICANT CHANGE UP (ref 3.5–5.3)
PROT SERPL-MCNC: 7.5 G/DL — SIGNIFICANT CHANGE UP (ref 6–8.3)
RBC # BLD: 4.06 M/UL — SIGNIFICANT CHANGE UP (ref 3.8–5.2)
RBC # FLD: 12.7 % — SIGNIFICANT CHANGE UP (ref 10.3–14.5)
SODIUM SERPL-SCNC: 142 MMOL/L — SIGNIFICANT CHANGE UP (ref 135–145)
TROPONIN I, HIGH SENSITIVITY RESULT: 6.9 NG/L — SIGNIFICANT CHANGE UP
WBC # BLD: 10.77 K/UL — HIGH (ref 3.8–10.5)
WBC # FLD AUTO: 10.77 K/UL — HIGH (ref 3.8–10.5)

## 2023-01-01 PROCEDURE — 93005 ELECTROCARDIOGRAM TRACING: CPT

## 2023-01-01 PROCEDURE — 80053 COMPREHEN METABOLIC PANEL: CPT

## 2023-01-01 PROCEDURE — 99285 EMERGENCY DEPT VISIT HI MDM: CPT | Mod: 25

## 2023-01-01 PROCEDURE — 36415 COLL VENOUS BLD VENIPUNCTURE: CPT

## 2023-01-01 PROCEDURE — 84484 ASSAY OF TROPONIN QUANT: CPT

## 2023-01-01 PROCEDURE — 71045 X-RAY EXAM CHEST 1 VIEW: CPT

## 2023-01-01 PROCEDURE — 71045 X-RAY EXAM CHEST 1 VIEW: CPT | Mod: 26

## 2023-01-01 PROCEDURE — 99285 EMERGENCY DEPT VISIT HI MDM: CPT

## 2023-01-01 PROCEDURE — 85025 COMPLETE CBC W/AUTO DIFF WBC: CPT

## 2023-01-01 NOTE — ED PROVIDER NOTE - NSFOLLOWUPINSTRUCTIONS_ED_ALL_ED_FT
Dolor en el hombro    Shoulder Pain      Muchas cosas pueden provocar dolor en el hombro, por ejemplo:  •Mariana lesión en el hombro.      •El uso excesivo del hombro.      •Artritis.      La causa del dolor puede ser lo siguiente:  •Inflamación.      •Mariana lesión en la articulación del hombro.      •Mariana lesión en un tendón, ligamento o hueso.        Siga estas indicaciones en mcduffie casa:    Esté atento a los cambios en los síntomas. Informe a mcduffie médico acerca de cualquier cambio. Siga estas indicaciones para aliviar el dolor.    Si tiene un cabestrillo:     •Úselo maddy se lo haya indicado el médico. Quíteselo solamente maddy se lo haya indicado el médico.       • Afloje el cabestrillo si los dedos de la mano se le adormecen, siente hormigueo o se le enfrían y se ponen azules.      •Mantenga el cabestrillo limpio.    •Si el cabestrillo no es impermeable:  •No deje que se moje. Quíteselo para ducharse o para bañarse.         •Mueva el brazo lo menos posible, aayush mantenga la mano en movimiento para evitar la hinchazón.        Control del dolor, la rigidez y la hinchazón   Bag of ice on a towel on the skin. •Si se lo indican, aplique hielo sobre la roc del dolor:  •Ponga el hielo en mariana bolsa plástica.      •Coloque mariana toalla entre la piel y la bolsa.      •Coloque el hielo jaqueline 20 minutos, 2 a 3 veces al día. Deje de aplicar hielo si no ayuda a aliviar el dolor.        •Apriete mariana pelota blanda o mariana almohadilla de goma tanto maddy sea posible. [place] ayuda e prevenir la hinchazón en el hombro. También ayuda a fortalecer el brazo.      Indicaciones generales     •Use los medicamentos de venta hawk y los recetados solamente maddy se lo haya indicado el médico.      •Concurra a todas las visitas de seguimiento maddy se lo haya indicado el médico. [place] es importante.        Comuníquese con un médico si:    •El dolor empeora.      •El dolor no se rafa con los medicamentos.      •Aparece un dolor nuevo en el brazo, la mano o los dedos.        Solicite ayuda inmediatamente si:  •El brazo, la mano o los dedos:  •Hormiguean.      •Se adormecen.      •Se hinchan.      •Duelen.      •Se tornan de color ugarte o matthew.          Resumen    •La causa del dolor en el hombro puede ser mariana lesión, el uso excesivo o la artritis.      •Esté atento a los cambios en los síntomas. Informe a mcduffie médico acerca de cualquier cambio.      •Esta afección se puede tratar con un cabestrillo, hielo y medicamentos para el dolor.      •Comuníquese con mcduffie médico si el dolor empeora o tiene un dolor nuevo. Solicite ayuda de inmediato si el brazo, la mano o los dedos se le adormecen o si siente hormigueo, se le hinchan o le duelen.      •Concurra a todas las visitas de seguimiento maddy se lo haya indicado el médico. [place] es importante.      Esta información no tiene maddy fin reemplazar el consejo del médico. Asegúrese de hacerle al médico cualquier pregunta que tenga.      Document Revised: 09/06/2022 Document Reviewed: 09/06/2022    Elsevier Patient Education © 2022 [employer] Inc[employer]

## 2023-01-01 NOTE — ED ADULT NURSE NOTE - LANGUAGE ASSISTANCE NEEDED
No-Patient/Caregiver offered and refused free interpretation services.
Please follow up with your primary care physician within 24-72 hours and return immediately if symptoms worsen.        Allergies    WHAT YOU NEED TO KNOW:    What are allergies? Allergies are an immune system reaction to a substance called an allergen. Your immune system sees the allergen as harmful and attacks it.     What causes allergies? You may have allergies at certain times of the year or all year. The following are common allergies:   •Seasonal airborne allergies happen during certain times of the year. This is also called hay fever. Tree, weed, or grass pollen are examples of allergens that you breathe in.      •Environmental airborne allergy triggers you may breathe in year-round include dust, mold, and pet hair.       •Contact allergies include latex, found in items such as condoms and medical gloves. Latex allergies can be very serious.       •Insect sting allergies may be caused by bees, hornets, fire ants, or other insects that sting or bite you. Insect allergies can be very serious.       •Food allergies commonly include shellfish, wheat, and eggs. Some foods must be eaten to produce an allergic reaction. Other foods can trigger a reaction if they touch your skin or are breathed in.      What increases my risk for allergies? Allergic reactions can happen at any time, even if you have not had allergies before. You may develop an allergy after you have been exposed to an allergen more than once. Allergies are most common in children and elderly people, but anyone can have an allergic reaction. Your risk is also increased if you have a family history of allergies or a medical condition such as asthma.    What are the signs and symptoms of allergies?   •Mild symptoms include sneezing and a runny, itchy, or stuffy nose. You may also have swollen, watery, or itchy eyes, or skin itching. You may have swelling or pain where an insect bit or stung you.      •Anaphylaxis symptoms include trouble breathing or swallowing, a rash or hives, or severe swelling. You may also have a cough, wheezing, or feel lightheaded or dizzy. Anaphylaxis is a sudden, life-threatening reaction that needs immediate treatment.      How are allergies diagnosed? Your healthcare provider will ask about your signs and symptoms. He or she will ask what allergens you have been exposed to and if you have ever had other allergic reactions. He or she may look in your nose, ears, or throat. You may need additional testing if you developed anaphylaxis after you were exposed to a trigger and then exercised. This is called exercise-induced anaphylaxis. You may also need the following tests:   •Blood tests are used to check for signs of a reaction to allergens.       •Nasal tests are used to see how your nasal passages react to allergens. A sample of your nasal fluid may also be tested.      •Skin tests can help your healthcare provider find what you are allergic to. He will place a small amount of allergen on your arm or back and then prick your skin with a needle. He will watch how your skin reacts to the allergen.       How are allergies treated?   •Antihistamines help decrease itching, sneezing, and swelling. You may take them as a pill or use drops in your nose or eyes.       •Decongestants help your nose feel less stuffy.       •Steroids decrease swelling and redness.       •Topical treatments help decrease itching or swelling. You also may be given nasal sprays or eyedrops.       •Epinephrine is medicine used to treat severe allergic reactions such as anaphylaxis.      •Desensitization gets your body used to allergens you cannot avoid. Your healthcare provider will give you a shot that contains a small amount of an allergen. He or she will treat any allergic reaction you have. Your provider will give you more of the allergen a little at a time until your body gets used to it. Your reaction to the allergen may be less serious after this treatment. Your healthcare provider will tell you how long to get the shots.       What steps do I need to take for signs or symptoms of anaphylaxis?   •Immediately give 1 shot of epinephrine only into the outer thigh muscle.       •Leave the shot in place as directed. Your healthcare provider may recommend you leave it in place for up to 10 seconds before you remove it. This helps make sure all of the epinephrine is delivered.       •Call 911 and go to the emergency department, even if the shot improved symptoms. Do not drive yourself. Bring the used epinephrine shot with you.       What safety precautions do I need to take if I am at risk for anaphylaxis?   •Keep 2 shots of epinephrine with you at all times. You may need a second shot, because epinephrine only works for about 20 minutes and symptoms may return. Your healthcare provider can show you and family members how to give the shot. Check the expiration date every month and replace it before it expires.      •Create an action plan. Your healthcare provider can help you create a written plan that explains the allergy and an emergency plan to treat a reaction. The plan explains when to give a second epinephrine shot if symptoms return or do not improve after the first. Give copies of the action plan and emergency instructions to family members and work staff. Show them how to give a shot of epinephrine.      •Be careful when you exercise. If you have had exercise-induced anaphylaxis, do not exercise right after you eat. Stop exercising right away if you start to develop any signs or symptoms of anaphylaxis. You may first feel tired, warm, or have itchy skin. Hives, swelling, and severe breathing problems may develop if you continue to exercise.      •Carry medical alert identification. Wear medical alert jewelry or carry a card that explains the allergy. Ask your healthcare provider where to get these items.   Medical Alert Jewelry           •Inform all healthcare providers of the allergy. This includes dentists, nurses, doctors, and surgeons.       How can I manage allergies?   •Use nasal rinses as directed. Rinse with a saline solution daily. This will help clear allergens out of your nose. Use distilled water if possible. You can also boil tap water and let it cool before you use it. Do not use tap water that has not been boiled.      •Do not smoke. Allergy symptoms may decrease if you are not around smoke. Nicotine and other chemicals in cigarettes and cigars can cause lung damage. Ask your healthcare provider for information if you currently smoke and need help to quit. E-cigarettes or smokeless tobacco still contain nicotine. Talk to your healthcare provider before you use these products.       How can I prevent an allergic reaction?   •Do not go outside when pollen counts are high if you have seasonal allergies. Your symptoms may be better if you go outside only in the morning or evening. Use your air conditioner, and change air filters often.       •Avoid dust, fur, and mold. Dust and vacuum your home often. You may want to wear a mask when you vacuum. Keep pets in certain rooms, and bathe them often. Use a dehumidifier (machine that decreases moisture) to help prevent mold.       •Do not use products that contain latex if you have a latex allergy. Use nonlatex gloves if you work in healthcare or in food preparation. Always tell healthcare providers about a latex allergy.       •Avoid areas that attract insects if you have an insect bite or sting allergy. Areas include trash cans, gardens, and picnics. Do not wear bright clothing or strong scents when you will be outside.      •Prevent an allergic reaction caused by food. You may have a reaction if your food is not prepared safely. For example, you could be served food that touched your trigger food during preparation. This is called cross-contamination. Kitchen tools can also cause cross-contamination. You may also eat baked foods that contain a trigger food you do not know about. Ask if the food contains your trigger food before you handle or eat it.      Call 911 for signs or symptoms of anaphylaxis, such as trouble breathing, swelling in your mouth or throat, or wheezing. You may also have itching, a rash, hives, or feel like you are going to faint.    When should I seek immediate care?   •You have tingling in your hands or feet.       •Your skin is red or flushed.       When should I contact my healthcare provider?   •You have questions or concerns about your condition or care.          CARE AGREEMENT:    You have the right to help plan your care. Learn about your health condition and how it may be treated. Discuss treatment options with your healthcare providers to decide what care you want to receive. You always have the right to refuse treatment.        © Copyright [employer] 2022

## 2023-01-01 NOTE — ED PROVIDER NOTE - OBJECTIVE STATEMENT
83 y/o woman, h/o HTN, hypothyroidism, MI, asthma, c/o left shoulder pain and mild shortness of breath since about 10 pm, but now feels better on eval.  Denies chest pain/fever/cough.  Former smoker.

## 2023-01-01 NOTE — ED ADULT TRIAGE NOTE - CHIEF COMPLAINT QUOTE
BIB daughter c/o left shoulder pain w/ sob, presently breathing at normal rate and states pain is mild

## 2023-01-01 NOTE — ED PROVIDER NOTE - PATIENT PORTAL LINK FT
You can access the FollowMyHealth Patient Portal offered by [facility] by registering at the following website: [email or web address]. By joining [employer]’s FollowMyHealth portal, you will also be able to view your health information using other applications (apps) compatible with our system.

## 2023-01-01 NOTE — ED ADULT NURSE NOTE - OBJECTIVE STATEMENT
Pt presents to ED with c/o left arm pain associated with shortness of breath. Pt denies any symptoms/discomfort at this time.

## 2023-01-01 NOTE — ED PROVIDER NOTE - CLINICAL SUMMARY MEDICAL DECISION MAKING FREE TEXT BOX
81 y/o woman, h/o HTN, hypothyroidism, MI, asthma, c/o left shoulder pain and mild shortness of breath since about 10 pm, but now feels better on eval--labs, EKG, CXR, reassess.

## 2023-01-10 NOTE — ED POST DISCHARGE NOTE - DETAILS
Patient and daughter contacted, no active symptoms at this time, recommended follow up CXR in 1-2 months for re-evaluation

## 2023-03-15 NOTE — ED PROVIDER NOTE - TEMPLATE, MLM
Prescribed lisinopril 20 mg daily, Toprol- mg daily.  Blood pressures appear to be adequately controlled with current treatment plan, including prescription blood pressure medication. Continue medical management and continue home blood pressure checks.  Blood pressure should be checked as discussed during medical visits, and average blood pressure should be no greater than 130/80.   General

## 2023-03-21 NOTE — ED PROVIDER NOTE - NSTIMEPROVIDERCAREINITIATE_GEN_ER
What Type Of Note Output Would You Prefer (Optional)?: Bullet Format Hpi Title: Evaluation of Skin Lesions How Severe Are Your Spot(S)?: moderate Have Your Spot(S) Been Treated In The Past?: has not been treated 28-Mar-2021 11:07

## 2023-03-31 ENCOUNTER — EMERGENCY (EMERGENCY)
Facility: HOSPITAL | Age: 83
LOS: 1 days | Discharge: HOME | End: 2023-03-31
Attending: EMERGENCY MEDICINE
Payer: COMMERCIAL

## 2023-03-31 VITALS
SYSTOLIC BLOOD PRESSURE: 144 MMHG | HEIGHT: 61 IN | HEART RATE: 108 BPM | OXYGEN SATURATION: 95 % | RESPIRATION RATE: 19 BRPM | DIASTOLIC BLOOD PRESSURE: 79 MMHG | WEIGHT: 160.06 LBS | TEMPERATURE: 98 F

## 2023-03-31 VITALS
TEMPERATURE: 99 F | OXYGEN SATURATION: 97 % | RESPIRATION RATE: 18 BRPM | SYSTOLIC BLOOD PRESSURE: 132 MMHG | HEART RATE: 98 BPM | DIASTOLIC BLOOD PRESSURE: 70 MMHG

## 2023-03-31 PROCEDURE — 96375 TX/PRO/DX INJ NEW DRUG ADDON: CPT

## 2023-03-31 PROCEDURE — 94640 AIRWAY INHALATION TREATMENT: CPT

## 2023-03-31 PROCEDURE — 96365 THER/PROPH/DIAG IV INF INIT: CPT

## 2023-03-31 PROCEDURE — 71045 X-RAY EXAM CHEST 1 VIEW: CPT | Mod: 26

## 2023-03-31 PROCEDURE — 99284 EMERGENCY DEPT VISIT MOD MDM: CPT

## 2023-03-31 PROCEDURE — 71045 X-RAY EXAM CHEST 1 VIEW: CPT

## 2023-03-31 PROCEDURE — 99284 EMERGENCY DEPT VISIT MOD MDM: CPT | Mod: 25

## 2023-03-31 RX ORDER — IPRATROPIUM/ALBUTEROL SULFATE 18-103MCG
3 AEROSOL WITH ADAPTER (GRAM) INHALATION
Qty: 21 | Refills: 0
Start: 2023-03-31 | End: 2023-04-06

## 2023-03-31 RX ORDER — ALBUTEROL 90 UG/1
2 AEROSOL, METERED ORAL
Qty: 1 | Refills: 0
Start: 2023-03-31 | End: 2023-04-29

## 2023-03-31 RX ORDER — IPRATROPIUM/ALBUTEROL SULFATE 18-103MCG
3 AEROSOL WITH ADAPTER (GRAM) INHALATION ONCE
Refills: 0 | Status: COMPLETED | OUTPATIENT
Start: 2023-03-31 | End: 2023-03-31

## 2023-03-31 RX ORDER — MAGNESIUM SULFATE 500 MG/ML
2 VIAL (ML) INJECTION ONCE
Refills: 0 | Status: COMPLETED | OUTPATIENT
Start: 2023-03-31 | End: 2023-03-31

## 2023-03-31 RX ORDER — IPRATROPIUM/ALBUTEROL SULFATE 18-103MCG
3 AEROSOL WITH ADAPTER (GRAM) INHALATION ONCE
Refills: 0 | Status: DISCONTINUED | OUTPATIENT
Start: 2023-03-31 | End: 2023-04-04

## 2023-03-31 RX ADMIN — Medication 3 MILLILITER(S): at 18:55

## 2023-03-31 RX ADMIN — Medication 125 MILLIGRAM(S): at 18:50

## 2023-03-31 RX ADMIN — Medication 2 GRAM(S): at 19:10

## 2023-03-31 RX ADMIN — Medication 3 MILLILITER(S): at 18:50

## 2023-03-31 RX ADMIN — Medication 150 GRAM(S): at 18:49

## 2023-03-31 NOTE — ED PROVIDER NOTE - PATIENT PORTAL LINK FT
You can access the FollowMyHealth Patient Portal offered by Health system by registering at the following website: [email or web address]. By joining [employer]’s FollowMyHealth portal, you will also be able to view your health information using other applications (apps) compatible with our system.

## 2023-03-31 NOTE — ED PROVIDER NOTE - OBJECTIVE STATEMENT
82 year old female with a PHMx of asthma and allergies presents to ED with shortness of breath and cough for x3 days. She has no history of congestive heart failure. NKDA.

## 2023-03-31 NOTE — ED ADULT TRIAGE NOTE - WAS YOUR LAST COVID-19 VACCINE GREATER THAN OR EQUAL TO TWO MONTHS AGO?
CHW reached out to pt to remind her of office appointment with Emilia Botello LCSW, on tomorrow and do complete assessments. No answer, left VM of appt and sent appt reminder and assessments to pt portal.    
Yes

## 2024-01-05 NOTE — ED PROVIDER NOTE - NSTIMEPROVIDERCAREINITIATE_GEN_ER
Problem: At Risk for Falls  Goal: # Patient does not fall  Outcome: Outcome Met, Continue evaluating goal progress toward completion  Goal: # Takes action to control fall-related risks  Outcome: Outcome Met, Continue evaluating goal progress toward completion  Goal: # Verbalizes understanding of fall risk/precautions  Description: Document education using the patient education activity  Outcome: Outcome Met, Continue evaluating goal progress toward completion     Problem: Pain  Goal: #Acceptable pain level achieved/maintained at rest using NRS/Faces  Description: This goal is used for patients who can self-report.  Acceptable means the level is at or below the identified comfort/function goal.  Outcome: Outcome Met, Continue evaluating goal progress toward completion  Goal: # Acceptable pain level achieved/maintained at rest using NRS/Faces without oversedation (opioid naive or PCA/Epidural infusion)  Description: This goal is used if Opioid-naïve or on PCA/Epidural Infusion.  Outcome: Outcome Met, Continue evaluating goal progress toward completion  Goal: # Acceptable pain level achieved/maintained with activity using NRS/Faces  Description: This goal is used for patients who can self-report and are not achieving acceptable pain control during activity.  Outcome: Outcome Met, Continue evaluating goal progress toward completion  Goal: Acceptable pain/comfort level is achieved/maintained at rest (based on Pain Behaviors Scale)  Description: This goal is used for patients who are not able to self-report pain and are assessed for pain using the Pain Behaviors Scale  Outcome: Outcome Met, Continue evaluating goal progress toward completion  Goal: Acceptable pain/comfort level is achieved/maintained at rest based on PAINAID scale (Dementia)  Description: This goal is used for patients who are not able to self-report pain, have dementia, and assessed using the PAINAD scale.  Outcome: Outcome Met, Continue evaluating  goal progress toward completion  Goal: # Verbalizes understanding of pain management  Description: Documented in Patient Education Activity  Outcome: Outcome Met, Continue evaluating goal progress toward completion      27-Oct-2020 10:38

## 2024-02-14 ENCOUNTER — EMERGENCY (EMERGENCY)
Facility: HOSPITAL | Age: 84
LOS: 1 days | Discharge: HOME | End: 2024-02-14
Attending: EMERGENCY MEDICINE
Payer: COMMERCIAL

## 2024-02-14 VITALS
OXYGEN SATURATION: 96 % | TEMPERATURE: 98 F | RESPIRATION RATE: 18 BRPM | SYSTOLIC BLOOD PRESSURE: 157 MMHG | WEIGHT: 163.14 LBS | HEART RATE: 97 BPM | HEIGHT: 61 IN | DIASTOLIC BLOOD PRESSURE: 82 MMHG

## 2024-02-14 PROBLEM — J45.909 UNSPECIFIED ASTHMA, UNCOMPLICATED: Chronic | Status: ACTIVE | Noted: 2023-03-31

## 2024-02-14 PROCEDURE — 99282 EMERGENCY DEPT VISIT SF MDM: CPT

## 2024-02-14 PROCEDURE — 99283 EMERGENCY DEPT VISIT LOW MDM: CPT

## 2024-02-14 NOTE — ED PROVIDER NOTE - NSFOLLOWUPINSTRUCTIONS_ED_ALL_ED_FT
please use heat packs to area 3x/day 20 mins each session, take motrin 600mg every 6 hrs as needed, tylenol 650mg every 4 hrs as needed, stay active, no heavy lifting and return if symptoms  worsens. see your MD for physical therapy. should last about 1 week. use the ace wrap to help support the leg    use compresas térmicas en el área 3 veces al día jaqueline 20 minutos en cada sesión, tome motrin 600 mg cada 6 horas según sea necesario, tylenol 650 mg cada 4 horas según sea necesario, manténgase activo, no levante objetos pesados ??y regrese si los síntomas empeoran. Consulte a mcduffie médico para recibir fisioterapia. debería durar aproximadamente 1 semana. use la envoltura ace para ayudar a sostener la pierna

## 2024-02-14 NOTE — ED PROVIDER NOTE - MUSCULOSKELETAL, MLM
Spine appears normal, range of motion is not limited, chronic jopint pain unchanged but left medial proximal leg ttp. no skin changes but stated was swollen and placed voltaren gel and ice

## 2024-02-14 NOTE — ED PROVIDER NOTE - PATIENT PORTAL LINK FT
You can access the FollowMyHealth Patient Portal offered by [facility] by registering at the following website: [email or web address]. By joining [employer]’s FollowMyHealth portal, you will also be able to view your health information using other applications (apps) compatible with our system.

## 2024-02-14 NOTE — ED ADULT TRIAGE NOTE - CHIEF COMPLAINT QUOTE
Patient c/o left knee pain, s/p trip and fall x couple of days ago at home. Denies hitting head, no LOC. Patient reports she takes 81mg aspirin daily.

## 2024-02-14 NOTE — ED PROVIDER NOTE - CLINICAL SUMMARY MEDICAL DECISION MAKING FREE TEXT BOX
83 yr old female with hx of asthma, arthritis, dvt in past on asa presents to ed c/o left medial proximal leg pain s/p trip and fall couple days ago. no head trauma. tylenol helps alittle. no improvement so came in.  no skin changes but stated was swollen and placed voltaren gel and ice    walking with cacne. no deformity. able to range knee. contusion. supportive care.

## 2024-02-14 NOTE — ED PROVIDER NOTE - OBJECTIVE STATEMENT
83 yr old female with hx of asthma, arthritis, dvt in past on asa presents to ed c/o left medial proximal leg pain s/p trip and fall couple days ago. no head trauma. tylenol helps alittle. no improvement so came in.  no skin changes but stated was swollen and placed voltaren gel and ice

## 2024-04-03 NOTE — ED PROVIDER NOTE - PROGRESS NOTE
ICU End of Shift Summary. See flowsheets for vital signs and detailed assessment.    Changes this shift: No acute changes. On RA w/SpO2 >95 clear-diminished lung sounds. ABG done on RA PCO2 61 & PO2 38.  Up the the chair most of the shift; worked w/both OT and PT.  Passed BS swallow eval on a regular diet w/ TF's. Slighty hypertensive -150.    Plan:  BiPAP at HS and during napping continue to monitor and w/ goals of care. Prn hydralazine available for SBP>160.  Goal Outcome Evaluation: Transferring out of ICU tomorrow.           Overall Patient Progress: improvingOverall Patient Progress: improving    Outcome Evaluation: Extubated to 4L       Improved.

## 2024-04-24 NOTE — ED ADULT NURSE NOTE - CAS DISCH BELONGINGS RETURNED
Patient seen and examined by me today. H&P reviewed and there are no changes since the last documented visit. Patient was given the opportunity to ask questions. All concerns and questions were answered to the patient's satisfaction.     Plan to proceed with surgery as scheduled.     Moris Ospina MD PGY 6  Advanced GI MIS/Bariatric Surgery Fellow  4/24/2024, 6:58 AM   
Not applicable

## 2024-12-25 ENCOUNTER — EMERGENCY (EMERGENCY)
Facility: HOSPITAL | Age: 84
LOS: 1 days | Discharge: HOME | End: 2024-12-25
Attending: STUDENT IN AN ORGANIZED HEALTH CARE EDUCATION/TRAINING PROGRAM
Payer: COMMERCIAL

## 2024-12-25 VITALS
WEIGHT: 162.04 LBS | RESPIRATION RATE: 20 BRPM | SYSTOLIC BLOOD PRESSURE: 176 MMHG | HEART RATE: 92 BPM | TEMPERATURE: 99 F | DIASTOLIC BLOOD PRESSURE: 78 MMHG | OXYGEN SATURATION: 95 %

## 2024-12-25 PROCEDURE — 99285 EMERGENCY DEPT VISIT HI MDM: CPT | Mod: 25

## 2024-12-26 ENCOUNTER — TRANSCRIPTION ENCOUNTER (OUTPATIENT)
Age: 84
End: 2024-12-26

## 2024-12-26 VITALS
OXYGEN SATURATION: 94 % | TEMPERATURE: 98 F | HEART RATE: 96 BPM | DIASTOLIC BLOOD PRESSURE: 81 MMHG | RESPIRATION RATE: 19 BRPM | SYSTOLIC BLOOD PRESSURE: 145 MMHG

## 2024-12-26 LAB
ALBUMIN SERPL ELPH-MCNC: 3.4 G/DL — LOW (ref 3.5–5)
ALP SERPL-CCNC: 48 U/L — SIGNIFICANT CHANGE UP (ref 40–120)
ALT FLD-CCNC: 17 U/L DA — SIGNIFICANT CHANGE UP (ref 10–60)
ANION GAP SERPL CALC-SCNC: 5 MMOL/L — SIGNIFICANT CHANGE UP (ref 5–17)
APPEARANCE UR: CLEAR — SIGNIFICANT CHANGE UP
AST SERPL-CCNC: 10 U/L — SIGNIFICANT CHANGE UP (ref 10–40)
BACTERIA # UR AUTO: ABNORMAL /HPF
BASOPHILS # BLD AUTO: 0.04 K/UL — SIGNIFICANT CHANGE UP (ref 0–0.2)
BASOPHILS NFR BLD AUTO: 0.4 % — SIGNIFICANT CHANGE UP (ref 0–2)
BILIRUB SERPL-MCNC: 0.6 MG/DL — SIGNIFICANT CHANGE UP (ref 0.2–1.2)
BILIRUB UR-MCNC: NEGATIVE — SIGNIFICANT CHANGE UP
BUN SERPL-MCNC: 13 MG/DL — SIGNIFICANT CHANGE UP (ref 7–18)
CALCIUM SERPL-MCNC: 9.1 MG/DL — SIGNIFICANT CHANGE UP (ref 8.4–10.5)
CHLORIDE SERPL-SCNC: 108 MMOL/L — SIGNIFICANT CHANGE UP (ref 96–108)
CO2 SERPL-SCNC: 28 MMOL/L — SIGNIFICANT CHANGE UP (ref 22–31)
COLOR SPEC: YELLOW — SIGNIFICANT CHANGE UP
CREAT SERPL-MCNC: 0.63 MG/DL — SIGNIFICANT CHANGE UP (ref 0.5–1.3)
CULTURE RESULTS: SIGNIFICANT CHANGE UP
DIFF PNL FLD: ABNORMAL
EGFR: 87 ML/MIN/1.73M2 — SIGNIFICANT CHANGE UP
EGFR: 87 ML/MIN/1.73M2 — SIGNIFICANT CHANGE UP
EOSINOPHIL # BLD AUTO: 0.15 K/UL — SIGNIFICANT CHANGE UP (ref 0–0.5)
EOSINOPHIL NFR BLD AUTO: 1.7 % — SIGNIFICANT CHANGE UP (ref 0–6)
GLUCOSE SERPL-MCNC: 129 MG/DL — HIGH (ref 70–99)
GLUCOSE UR QL: NEGATIVE MG/DL — SIGNIFICANT CHANGE UP
HCT VFR BLD CALC: 37.4 % — SIGNIFICANT CHANGE UP (ref 34.5–45)
HGB BLD-MCNC: 12.2 G/DL — SIGNIFICANT CHANGE UP (ref 11.5–15.5)
IMM GRANULOCYTES NFR BLD AUTO: 0.2 % — SIGNIFICANT CHANGE UP (ref 0–0.9)
KETONES UR-MCNC: ABNORMAL MG/DL
LEUKOCYTE ESTERASE UR-ACNC: NEGATIVE — SIGNIFICANT CHANGE UP
LIDOCAIN IGE QN: 41 U/L — SIGNIFICANT CHANGE UP (ref 13–75)
LYMPHOCYTES # BLD AUTO: 1.93 K/UL — SIGNIFICANT CHANGE UP (ref 1–3.3)
LYMPHOCYTES # BLD AUTO: 21.6 % — SIGNIFICANT CHANGE UP (ref 13–44)
MCHC RBC-ENTMCNC: 30.4 PG — SIGNIFICANT CHANGE UP (ref 27–34)
MCHC RBC-ENTMCNC: 32.6 G/DL — SIGNIFICANT CHANGE UP (ref 32–36)
MCV RBC AUTO: 93.3 FL — SIGNIFICANT CHANGE UP (ref 80–100)
MONOCYTES # BLD AUTO: 0.94 K/UL — HIGH (ref 0–0.9)
MONOCYTES NFR BLD AUTO: 10.5 % — SIGNIFICANT CHANGE UP (ref 2–14)
NEUTROPHILS # BLD AUTO: 5.87 K/UL — SIGNIFICANT CHANGE UP (ref 1.8–7.4)
NEUTROPHILS NFR BLD AUTO: 65.6 % — SIGNIFICANT CHANGE UP (ref 43–77)
NITRITE UR-MCNC: NEGATIVE — SIGNIFICANT CHANGE UP
NRBC # BLD: 0 /100 WBCS — SIGNIFICANT CHANGE UP (ref 0–0)
NRBC BLD-RTO: 0 /100 WBCS — SIGNIFICANT CHANGE UP (ref 0–0)
PH UR: 6 — SIGNIFICANT CHANGE UP (ref 5–8)
PLATELET # BLD AUTO: 195 K/UL — SIGNIFICANT CHANGE UP (ref 150–400)
POTASSIUM SERPL-MCNC: 4.1 MMOL/L — SIGNIFICANT CHANGE UP (ref 3.5–5.3)
POTASSIUM SERPL-SCNC: 4.1 MMOL/L — SIGNIFICANT CHANGE UP (ref 3.5–5.3)
PROT SERPL-MCNC: 7.6 G/DL — SIGNIFICANT CHANGE UP (ref 6–8.3)
PROT UR-MCNC: NEGATIVE MG/DL — SIGNIFICANT CHANGE UP
RBC # BLD: 4.01 M/UL — SIGNIFICANT CHANGE UP (ref 3.8–5.2)
RBC # FLD: 13 % — SIGNIFICANT CHANGE UP (ref 10.3–14.5)
RBC CASTS # UR COMP ASSIST: <2 /HPF — SIGNIFICANT CHANGE UP (ref 0–4)
SODIUM SERPL-SCNC: 141 MMOL/L — SIGNIFICANT CHANGE UP (ref 135–145)
SP GR SPEC: 1.01 — SIGNIFICANT CHANGE UP (ref 1–1.03)
SPECIMEN SOURCE: SIGNIFICANT CHANGE UP
UROBILINOGEN FLD QL: 1 MG/DL — SIGNIFICANT CHANGE UP (ref 0.2–1)
WBC # BLD: 8.95 K/UL — SIGNIFICANT CHANGE UP (ref 3.8–10.5)
WBC # FLD AUTO: 8.95 K/UL — SIGNIFICANT CHANGE UP (ref 3.8–10.5)
WBC UR QL: <2 /HPF — SIGNIFICANT CHANGE UP (ref 0–5)

## 2024-12-26 PROCEDURE — 74177 CT ABD & PELVIS W/CONTRAST: CPT | Mod: MC

## 2024-12-26 PROCEDURE — 80053 COMPREHEN METABOLIC PANEL: CPT

## 2024-12-26 PROCEDURE — 83690 ASSAY OF LIPASE: CPT

## 2024-12-26 PROCEDURE — 74177 CT ABD & PELVIS W/CONTRAST: CPT | Mod: 26,MC

## 2024-12-26 PROCEDURE — 36415 COLL VENOUS BLD VENIPUNCTURE: CPT

## 2024-12-26 PROCEDURE — 87086 URINE CULTURE/COLONY COUNT: CPT

## 2024-12-26 PROCEDURE — 81001 URINALYSIS AUTO W/SCOPE: CPT

## 2024-12-26 PROCEDURE — 36000 PLACE NEEDLE IN VEIN: CPT

## 2024-12-26 PROCEDURE — 99284 EMERGENCY DEPT VISIT MOD MDM: CPT | Mod: 25

## 2024-12-26 PROCEDURE — 85025 COMPLETE CBC W/AUTO DIFF WBC: CPT

## 2024-12-26 RX ORDER — AMOXICILLIN AND CLAVULANATE POTASSIUM 500; 125 MG/1; MG/1
1 TABLET, FILM COATED ORAL ONCE
Refills: 0 | Status: COMPLETED | OUTPATIENT
Start: 2024-12-26 | End: 2024-12-26

## 2024-12-26 RX ORDER — ACETAMINOPHEN 500 MG/5ML
650 LIQUID (ML) ORAL ONCE
Refills: 0 | Status: COMPLETED | OUTPATIENT
Start: 2024-12-26 | End: 2024-12-26

## 2024-12-26 RX ORDER — AMOXICILLIN AND CLAVULANATE POTASSIUM 500; 125 MG/1; MG/1
875 TABLET, FILM COATED ORAL
Qty: 14 | Refills: 0
Start: 2024-12-26 | End: 2025-01-01

## 2024-12-26 RX ADMIN — Medication 650 MILLIGRAM(S): at 01:30

## 2024-12-26 RX ADMIN — AMOXICILLIN AND CLAVULANATE POTASSIUM 1 TABLET(S): 500; 125 TABLET, FILM COATED ORAL at 05:18

## 2025-03-24 NOTE — ED ADULT NURSE NOTE - PRIMARY CARE PROVIDER
59 yo female with h/o HTN, DM presented with headache, found to have hypertensive urgency. Evaluated by neurology, CT head and CT angio head and neck was obtained. Recommended to admit for further management.    Hypertensive urgency  -presented with headache, nausea  -headache has improved after blood pressure control  -continue amlodipine, increase to 10 mg daily    DM  -was on metformin and glimepiride   -ISS for now    UTI  -continue ceftriaxone    Handoff: continue HTN control, follow up with neurology about the necessity of MRI brain Headache improving on my evaluation, characterization c/w migraine headache. Exam unremarkable. Imaging reviewed, MRI Brain without acute findings, MRV without CVT, normal variant of anterior SSS. rEEG notable for L frontal sharp transients - clinical suspicion for seizure low at this time, patient denies known prior seizures; no family hx of epilepsy. Suspect migraine vs secondary HA 2/2 HTN (SBP 190s on arrival). Recs as above. Robert Martinez

## 2025-04-03 ENCOUNTER — EMERGENCY (EMERGENCY)
Facility: HOSPITAL | Age: 85
LOS: 1 days | Discharge: HOME | End: 2025-04-03
Attending: STUDENT IN AN ORGANIZED HEALTH CARE EDUCATION/TRAINING PROGRAM
Payer: COMMERCIAL

## 2025-04-03 VITALS
HEART RATE: 90 BPM | SYSTOLIC BLOOD PRESSURE: 155 MMHG | OXYGEN SATURATION: 97 % | DIASTOLIC BLOOD PRESSURE: 83 MMHG | WEIGHT: 160.94 LBS | TEMPERATURE: 98 F | RESPIRATION RATE: 18 BRPM | HEIGHT: 62 IN

## 2025-04-03 PROCEDURE — 73140 X-RAY EXAM OF FINGER(S): CPT

## 2025-04-03 PROCEDURE — 99284 EMERGENCY DEPT VISIT MOD MDM: CPT

## 2025-04-03 PROCEDURE — 99283 EMERGENCY DEPT VISIT LOW MDM: CPT | Mod: 25

## 2025-04-03 PROCEDURE — 73140 X-RAY EXAM OF FINGER(S): CPT | Mod: 26,LT

## 2025-04-03 RX ORDER — ACETAMINOPHEN 500 MG/5ML
975 LIQUID (ML) ORAL ONCE
Refills: 0 | Status: COMPLETED | OUTPATIENT
Start: 2025-04-03 | End: 2025-04-03

## 2025-04-03 NOTE — ED ADULT TRIAGE NOTE - CHIEF COMPLAINT QUOTE
Pt presents to the ED c/o left hand pinkey pain s/p accidental injury with kitchen wall x 3 days ago. No active bleeding present. Pt on 81 mg baby aspirin.

## 2025-04-03 NOTE — ED PROVIDER NOTE - PHYSICAL EXAMINATION
General: well appearing female, no acute distress   HEENT: normocephalic, atraumatic   Respiratory: normal work of breathing  MSK: left 5th digit swelling, full ROM, erythema, tenderness to palpation at the tip    Skin: warm, dry   Neuro: A&Ox3  Psych: appropriate affect

## 2025-04-03 NOTE — ED PROVIDER NOTE - NSFOLLOWUPINSTRUCTIONS_ED_ALL_ED_FT
Lo atendieron en el departamento de emergencias por dolor en el dedo.     Jeffy un seguimiento con mcduffie médico de atención primaria dentro de las próximas 24 a 48 horas.    [place] Tylenol según lo prescrito en el frasco para controlar el dolor.     Si tiene algún síntoma que empeora, dolor de shonna intenso, dolor en el pecho, dificultad para respirar, regrese al departamento de emergencias.    Translation via [employer]. Cannot guarantee accuracy.     You were seen in the emergency department for finger pain.     Please follow-up with your primary care doctor within the next 24-48 hours.    Please take Tylenol as prescribed on the bottle for pain control.     If you have any worsening symptoms, severe headache, chest pain, trouble breathing, please return to the emergency department.

## 2025-04-03 NOTE — ED PROVIDER NOTE - OBJECTIVE STATEMENT
84-year-old female presenting with left pinky pain.  Patient translated by patient's daughter at bedside.  3 days ago the patient tripped when she was in the kitchen and her finger jammed into the wall.  There was no bleeding but since then she has had pain.

## 2025-04-03 NOTE — ED PROVIDER NOTE - CLINICAL SUMMARY MEDICAL DECISION MAKING FREE TEXT BOX
83 female presenting with left fifth digit pain after trauma 3 days ago.  Patient with tenderness to palpation of the tip but has full range of motion.  Will obtain x-ray to assess for fracture.

## 2025-04-03 NOTE — ED ADULT NURSE NOTE - OBJECTIVE STATEMENT
No Assumed pt care for an 84 yr old female complaining of right hand finger pain. Pt reports over the last three days she has been having pain with redness and swelling.     Pt refused pain medication MD made aware. Pt educated on pain medication. Verbalized understanding and refused.

## 2025-04-03 NOTE — ED PROVIDER NOTE - PATIENT PORTAL LINK FT
You can access the FollowMyHealth Patient Portal offered by [facility] by registering at the following website: [email or web address]. By joining [employer]’s FollowMyHealth portal, you will also be able to view your health information using other applications (apps) compatible with our system.

## 2025-04-28 NOTE — ED PROVIDER NOTE - ENMT, MLM
What Type Of Note Output Would You Prefer (Optional)?: Standard Output Hpi Title: Evaluation of Skin Lesions How Severe Are Your Spot(S)?: mild Have Your Spot(S) Been Treated In The Past?: has not been treated Airway patent, Nasal mucosa clear. Mouth with normal mucosa. Throat has no vesicles, no oropharyngeal exudates and uvula is midline.

## 2025-06-05 NOTE — ED PROVIDER NOTE - CONSTITUTIONAL NEGATIVE STATEMENT, MLM
[facility] - ICU  Pulmonary Critical Care Note    Patient Name: Ran Reyes Jr.  MRN: [id number]  Admission Date: 5/23/2025  Hospital Length of Stay: 12 days  Code Status: Full Code  Attending Provider: Dr. Roche  Primary Care Provider: Abiodun Recinos DO     Subjective:     HPI:   This is a 67 year old male who was initially admitted on 05/23/2025  for generalized weakness, fatigue and bilateral lower extremity edema.  He is also requesting nursing home placement as he has no family assistance.  Of note, patient was recently discharged at [facility] last 05/20/2025 after being treated for cardiogenic shock, adrenal insufficiency.  Since admission, patient is being treated for encephalopathy likely secondary to alcohol use disorder, decompensated heart failure with reduced ejection fraction, and acute kidney injury.  Patient given IV diuresis as blood pressure allows, prednisone taper was continued for adrenal insufficiency, gastroenterology was consulted for cirrhosis (cardiac versus alcohol versus multifactorial) and due to prolonged INR initially rivaroxaban was withheld, nephrology was on board for acute on chronic kidney disease.     Hospital Course/Significant events:  05/23/2025: Admitted at hospital medicine   06/02/2025:  Upgraded to ICU    24 Hour Interval History:  No acute events overnight. AO x 4 this morning. On HFNC, 20L. Patient hypokalemic on labs this morning, being repleted.  Patient received Bumex 4 mg yesterday in addition to Diamox 250, net urine output -2.1 L over the past 24 hours. On 0.06 mcg/kg/min of levophed. Edema improved.      Past Medical History:   Diagnosis Date    A-fib     Anticoagulant long-term use     Anxiety     Aortic aneurysm     Cataract     CHF (congestive heart failure)     Chronic atrial fibrillation     COPD (chronic obstructive pulmonary disease)     Coronary artery disease     Depression     HLD (hyperlipidemia)     Hypertension     Mitral regurgitation     PAD  (peripheral artery disease)     Primary open angle glaucoma (POAG)        Past Surgical History:   Procedure Laterality Date    ANGIOGRAM, CORONARY, WITH LEFT HEART CATHETERIZATION N/A 03/26/2024    Procedure: Angiogram, Coronary, with Left Heart Cath;  Surgeon: Adriano Montiel MD;  Location: [facility] CATH LAB;  Service: Cardiology;  Laterality: N/A;    ATHERECTOMY OF PERIPHERAL VESSEL Left 09/12/2022    LEFT SFA ATHERECTOMY, BALLOON ANGIOPLASTY    CATARACT EXTRACTION W/  INTRAOCULAR LENS IMPLANT Left 03/09/2023    Procedure: EXTRACTION, CATARACT, WITH IOL INSERTION;  Surgeon: Georgi Borja MD;  Location: [facility];  Service: Ophthalmology;  Laterality: Left;  19.5  mac    EGD, WITH CLOSED BIOPSY  03/22/2024    Procedure: EGD, WITH CLOSED BIOPSY;  Surgeon: Ronald Calderon MD;  Location: [facility] ENDOSCOPY;  Service: Gastroenterology;;    ESOPHAGOGASTRODUODENOSCOPY N/A 03/22/2024    Procedure: EGD;  Surgeon: Ronald Calderon MD;  Location: [facility] ENDOSCOPY;  Service: Gastroenterology;  Laterality: N/A;    Heart Stent N/A     > 10yrs. AGO    INCISION AND DRAINAGE N/A 03/18/2024    Procedure: Incision and Drainage;  Surgeon: Fahad Rivas MD;  Location: [facility];  Service: Urology;  Laterality: N/A;  I&D SCROTAL ABSCESS    INSERTION OF STENT INTO PERIPHERAL VESSEL Right 10/17/2022    RIGHT SFA ATHERECTOMY, BALLOON ANGIOPLASTY, STENT; RIGHT ANTERIOR TIBIAL ARTERY ATHERECTOMY, BALLOON ANGIOPLASTY    ORCHIECTOMY Left 03/18/2024    Procedure: ORCHIECTOMY;  Surgeon: Fahad Rivas MD;  Location: [facility];  Service: Urology;  Laterality: Left;       Social History[1]    Current Outpatient Medications   Medication Instructions    acetaminophen 650 mg, 2 times daily PRN    albuterol (PROVENTIL/VENTOLIN HFA) 90 mcg/actuation inhaler 2 puffs, Inhalation, Every 6 hours PRN, Rescue    albuterol-ipratropium (DUO-NEB) 2.5 mg-0.5 mg/3 mL nebulizer solution 3 mLs, Nebulization, Every 6 hours PRN, Rescue     atorvastatin (LIPITOR) 80 mg, Oral, Daily    BREZTRI AEROSPHERE 160-9-4.8 mcg/actuation HFAA 2 puffs, 2 times daily    bumetanide (BUMEX) 1 mg, Oral, Daily PRN    clopidogreL (PLAVIX) 75 mg, Oral, Daily    diphenhydrAMINE (BENADRYL) 25 mg, Oral, Every 6 hours PRN    folic acid (FOLVITE) 1 mg, Oral, Daily    gabapentin (NEURONTIN) 300 mg, Oral, 3 times daily    olopatadine (PATANOL) 0.1 % ophthalmic solution Apply to eye.    pantoprazole (PROTONIX) 40 mg, Oral, Daily    potassium chloride SA (K-DUR,KLOR-CON) 20 MEQ tablet 20 mEq, Oral, 2 times daily    predniSONE (DELTASONE) 5 MG tablet Take 4 tablets (20 mg total) by mouth 2 (two) times daily for 7 days, THEN 3 tablets (15 mg total) 2 (two) times daily for 7 days, THEN 2 tablets (10 mg total) 2 (two) times daily for 7 days, THEN 1 tablet (5 mg total) 2 (two) times daily for 7 days.    QUEtiapine (SEROQUEL) 100 mg, Oral, Nightly    rivaroxaban (XARELTO) 20 mg, Oral, Daily    senna-docusate (PERICOLACE) 8.6-50 mg per tablet 1 tablet, Oral, 2 times daily PRN    sertraline (ZOLOFT) 100 mg, Oral, Daily    sulfamethoxazole-trimethoprim 800-160mg (BACTRIM DS) 800-160 mg Tab 1 tablet, Oral, 2 times daily    [Paused] urea (CARMOL) 40 % Crea Apply topically.    [Paused] varenicline tartrate (CHANTIX) 0.5 mg    vitamin D (VITAMIN D3) 1,000 Units, Daily     Current Inpatient Medications   apixaban  5 mg Oral BID    atorvastatin  80 mg Oral Daily    clopidogreL  75 mg Oral Daily    epoetin deep-ebpx (RETACRIT) injection  10,000 Units Subcutaneous Q7 Days    ferrous sulfate  1 tablet Oral Daily    fluconazole  100 mg Oral Daily    folic acid  1 mg Oral Daily    lactulose  30 g Oral TID    multivitamin  1 tablet Oral Daily    mupirocin   Nasal BID    pantoprazole  40 mg Oral Daily    potassium chloride  10 mEq Intravenous Q1H    QUEtiapine  25 mg Oral QHS    sertraline  50 mg Oral Daily    thiamine  500 mg Oral Daily    vitamin D  1,000 Units Oral Daily     Current Intravenous  Infusions   DOBUTamine IV infusion (non-titrating)  2.5 mcg/kg/min Intravenous Continuous 3.1 mL/hr at 06/05/25 0530 2.5 mcg/kg/min at 06/05/25 0530    NORepinephrine bitartrate-D5W  0-3 mcg/kg/min Intravenous Continuous   Stopped at 06/05/25 0330       Objective:     Intake/Output Summary (Last 24 hours) at 6/5/2025 0803  Last data filed at 6/5/2025 0530  Gross per 24 hour   Intake 1447.66 ml   Output 4000 ml   Net -2552.34 ml     Vital Signs (Most Recent):  Temp: 98.5 °F (36.9 °C) (06/05/25 0345)  Pulse: 79 (06/05/25 0656)  Resp: (!) 23 (06/05/25 0656)  BP: (!) 83/60 (06/05/25 0515)  SpO2: 100 % (06/05/25 0656)  Body mass index is 25.27 kg/m².  Weight: 79.9 kg (176 lb 2.4 oz) Vital Signs (24h Range):  Temp:  [97.6 °F (36.4 °C)-98.5 °F (36.9 °C)] 98.5 °F (36.9 °C)  Pulse:  [] 79  Resp:  [13-29] 23  SpO2:  [72 %-100 %] 100 %  BP: ()/() 83/60     Physical Exam  Constitutional:       General: He is not in acute distress.     Appearance: He is ill-appearing.   HENT:      Head: Normocephalic and atraumatic.   Eyes:      Extraocular Movements: Extraocular movements intact.      Conjunctiva/sclera: Conjunctivae normal.      Pupils: Pupils are equal, round, and reactive to light.   Cardiovascular:      Rate and Rhythm: Normal rate and regular rhythm.      Heart sounds: Murmur heard.   Pulmonary:      Breath sounds: No wheezing.      Comments: On hfnc, course breath sounds.   Abdominal:      Comments: Distended   Musculoskeletal:      Cervical back: Normal range of motion and neck supple.      Right lower leg: No edema.      Left lower leg: No edema.   Neurological:      Mental Status: He is alert and oriented to person, place, and time.           Lines/Drains/Airways       Drain  Duration                  Rectal Tube 06/03/25 0234 rectal tube w/ balloon (indicate number of mLs) 2 days              Peripheral Intravenous Line  Duration                  Peripheral IV - Single Lumen 06/02/25 0015 18 G  Anterior;Proximal;Right Upper Arm 3 days         Peripheral IV - Single Lumen 06/04/25 1708 20 G Anterior;Distal;Left Forearm <1 day                  Significant Labs:  Lab Results   Component Value Date    WBC 6.38 06/05/2025    HGB 9.9 (L) 06/05/2025    HCT 31.5 (L) 06/05/2025    MCV 81.0 06/05/2025    PLT 44 (L) 06/05/2025       BMP  Lab Results   Component Value Date     06/05/2025    K 2.4 (LL) 06/05/2025    CO2 33 (H) 06/05/2025    BUN 51.2 (H) 06/05/2025    CREATININE 1.64 (H) 06/05/2025    CALCIUM 9.9 06/05/2025    AGAP 14.0 06/05/2025    EGFRNONAA 88 (L) 12/06/2021     ABG  Recent Labs   Lab 06/03/25  0715   PH 7.520*   PO2 93.0   PCO2 54.0*   HCO3 44.1*     Mechanical Ventilation Support:  Oxygen Concentration (%): 80 (06/05/25 0656)    Significant Imaging:  I have reviewed the pertinent imaging within the past 24 hours.    Assessment/Plan:     Assessment  Acute hypoxic respiratory failure likely secondary to decompensated heart failure  CXR 6/1/25:  Increased pulmonary markings bilaterally most consistent with pulmonary edema  Cirrhosis with hepatic encephalopathy   Elevated ammonia  Transaminitis  Thrombocytopenia  Decompensated heart failure with reduced ejection fraction 22%  Echo 04/30/2025:  Severe global hypokinesis with severely reduced systolic function EF of 22%, grade 3 diastolic dysfunction  Adrenal insufficiency on steroid taper  Acute on chronic kidney disease  Hyponatremia-improved  Hypokalemia  Chronic microcytic anemia  Iron-deficiency  Worsening thrombocytopenia  Atrial fibrillation  Nonobstructive epicardial coronary artery disease  Elevated troponin  Chronic obstructive pulmonary disease  Bedbug infestation     Plan  Admited to ICU for closer monitoring.  Patient wishes to be transferred to [facility] for transplant eval  Discussed hospice as alternative., pt wishes to be transferred still  Communicated that he is not guaranteed a transplant given his Comorbidities and support  system  CTA to rule out pulmonary embolism in the setting of hypoxemia and currently not on VTE prophylaxis (previously on Xarelto but was stopped due to prolonged INR) - showed significant volume overload  Continue oxygen supplementation to maintain saturation 88-92%  On levo for pressure support.  Titrate to maintain MAP > 65. Currently on levo 0.06 mcg/kg/min.   Repeat echocardiogram shows severe mitral regurgitation.   Cardiology following, appreciate assistance.  Recommendations for continued diuresis.    No SGLT2 due to history of Claudine's.    Unable to tolerate GDMT at this time.  GI following, appreciate assistance.    Okay to resume anticoagulation from GI standpoint.  Likely cirrhosis-cardiac 1st alcohol versus multifactorial.    Continue lactulose, titrate 2-3 bowel movements daily.  Nephrology following, appreciate assistance.    Recommendations to hold diuretics, started Diamox 250 mg IV.  Continue prednisone taper  Taper off medications contributing to somnolence  Thrombocytopenia stable from yesterday.  No signs of active bleeding.  Continue to monitor.   Hold initiation of empiric antibiotics for now, pt had  previous cultures with multi-drug resistant E coli  Diflucan course ; 200 then 100 x 2 days  (last day today)    DVT Prophylaxis: SCD  GI Prophylaxis: Protonix      32 minutes of critical care was time spent personally by me on the following activities: development of treatment plan with patient or surrogate and bedside caregivers, discussions with consultants, evaluation of patient's response to treatment, examination of patient, ordering and performing treatments and interventions, ordering and review of laboratory studies, ordering and review of radiographic studies, pulse oximetry, re-evaluation of patient's condition.  This critical care time did not overlap with that of any other provider or involve time for any procedures.     Kishan Soto MD  [facility], PGY-2  Pulmonary &  Critical Care Medicine  [facility] - ICU         [1]   Social History  Socioeconomic History    Marital status: Single   Tobacco Use    Smoking status: Every Day     Current packs/day: 0.50     Average packs/day: 0.8 packs/day for 50.4 years (41.0 ttl pk-yrs)     Types: Cigarettes     Start date: 1975     Passive exposure: Current    Smokeless tobacco: Never    Tobacco comments:     States smoke 2-3 cigarettes per day   Substance and Sexual Activity    Alcohol use: Yes     Alcohol/week: 3.0 standard drinks of alcohol     Types: 3 Cans of beer per week     Comment: socially    Drug use: Not Currently     Frequency: 1.0 times per week     Types: Marijuana     Comment: no use in over 1 year    Sexual activity: Not Currently     Partners: Female     Social Drivers of Health     Financial Resource Strain: Low Risk  (5/24/2025)    Overall Financial Resource Strain (CARDIA)     Difficulty of Paying Living Expenses: Not hard at all   Food Insecurity: No Food Insecurity (5/24/2025)    Hunger Vital Sign     Worried About Running Out of Food in the Last Year: Never true     Ran Out of Food in the Last Year: Never true   Transportation Needs: No Transportation Needs (5/24/2025)    PRAPARE - Transportation     Lack of Transportation (Medical): No     Lack of Transportation (Non-Medical): No   Physical Activity: Inactive (5/24/2025)    Exercise Vital Sign     Days of Exercise per Week: 0 days     Minutes of Exercise per Session: 0 min   Stress: No Stress Concern Present (5/24/2025)    [employer] - Occupational Stress Questionnaire     Feeling of Stress : Not at all   Housing Stability: Low Risk  (5/24/2025)    Housing Stability Vital Sign     Unable to Pay for Housing in the Last Year: No     Homeless in the Last Year: No      no fever and no chills.